# Patient Record
Sex: MALE | Race: BLACK OR AFRICAN AMERICAN | URBAN - METROPOLITAN AREA
[De-identification: names, ages, dates, MRNs, and addresses within clinical notes are randomized per-mention and may not be internally consistent; named-entity substitution may affect disease eponyms.]

---

## 2017-01-05 ENCOUNTER — OFFICE VISIT (OUTPATIENT)
Dept: PRIMARY CARE CLINIC | Age: 52
End: 2017-01-05

## 2017-01-05 VITALS
OXYGEN SATURATION: 96 % | HEART RATE: 91 BPM | DIASTOLIC BLOOD PRESSURE: 100 MMHG | BODY MASS INDEX: 39.65 KG/M2 | HEIGHT: 70 IN | WEIGHT: 277 LBS | SYSTOLIC BLOOD PRESSURE: 160 MMHG

## 2017-01-05 DIAGNOSIS — I10 ESSENTIAL HYPERTENSION: ICD-10-CM

## 2017-01-05 DIAGNOSIS — E11.69 TYPE 2 DIABETES MELLITUS WITH OTHER SPECIFIED COMPLICATION (HCC): ICD-10-CM

## 2017-01-05 DIAGNOSIS — E78.49 OTHER HYPERLIPIDEMIA: ICD-10-CM

## 2017-01-05 DIAGNOSIS — J06.9 URI, ACUTE: Primary | ICD-10-CM

## 2017-01-05 PROCEDURE — 99214 OFFICE O/P EST MOD 30 MIN: CPT | Performed by: INTERNAL MEDICINE

## 2017-01-05 RX ORDER — LOSARTAN POTASSIUM AND HYDROCHLOROTHIAZIDE 25; 100 MG/1; MG/1
1 TABLET ORAL DAILY
Qty: 30 TABLET | Refills: 5 | Status: SHIPPED | OUTPATIENT
Start: 2017-01-05 | End: 2017-03-09 | Stop reason: SDUPTHER

## 2017-01-05 RX ORDER — ASPIRIN 81 MG/1
81 TABLET ORAL DAILY
Qty: 90 TABLET | Refills: 5 | Status: SHIPPED | OUTPATIENT
Start: 2017-01-05 | End: 2017-03-09 | Stop reason: SDUPTHER

## 2017-01-05 RX ORDER — ACETAMINOPHEN 500 MG
500 TABLET ORAL EVERY 6 HOURS PRN
Qty: 30 TABLET | Refills: 1 | Status: SHIPPED | OUTPATIENT
Start: 2017-01-05 | End: 2017-08-24 | Stop reason: SDUPTHER

## 2017-01-05 RX ORDER — GUAIFENESIN 600 MG/1
600 TABLET, EXTENDED RELEASE ORAL 2 TIMES DAILY
Qty: 12 TABLET | Refills: 0 | Status: SHIPPED | OUTPATIENT
Start: 2017-01-05 | End: 2017-03-09

## 2017-01-05 RX ORDER — METFORMIN HYDROCHLORIDE 500 MG/1
500 TABLET, EXTENDED RELEASE ORAL
Qty: 30 TABLET | Refills: 5 | Status: SHIPPED | OUTPATIENT
Start: 2017-01-05 | End: 2017-03-09 | Stop reason: SDUPTHER

## 2017-01-05 RX ORDER — AMLODIPINE BESYLATE 5 MG/1
5 TABLET ORAL DAILY
Qty: 30 TABLET | Refills: 3 | Status: SHIPPED | OUTPATIENT
Start: 2017-01-05 | End: 2017-03-09 | Stop reason: SDUPTHER

## 2017-01-05 RX ORDER — ATORVASTATIN CALCIUM 20 MG/1
20 TABLET, FILM COATED ORAL DAILY
Qty: 30 TABLET | Refills: 6 | Status: SHIPPED | OUTPATIENT
Start: 2017-01-05 | End: 2017-03-09 | Stop reason: SDUPTHER

## 2017-01-05 ASSESSMENT — ENCOUNTER SYMPTOMS
SHORTNESS OF BREATH: 0
WHEEZING: 0
CHEST TIGHTNESS: 0
GASTROINTESTINAL NEGATIVE: 1
BLURRED VISION: 0
DIARRHEA: 0
COUGH: 1
CONSTIPATION: 0
BLOOD IN STOOL: 0

## 2017-03-09 ENCOUNTER — OFFICE VISIT (OUTPATIENT)
Dept: PRIMARY CARE CLINIC | Age: 52
End: 2017-03-09

## 2017-03-09 VITALS
TEMPERATURE: 98.8 F | BODY MASS INDEX: 39.6 KG/M2 | DIASTOLIC BLOOD PRESSURE: 90 MMHG | WEIGHT: 276 LBS | OXYGEN SATURATION: 97 % | SYSTOLIC BLOOD PRESSURE: 150 MMHG

## 2017-03-09 DIAGNOSIS — E11.69 TYPE 2 DIABETES MELLITUS WITH OTHER SPECIFIED COMPLICATION (HCC): ICD-10-CM

## 2017-03-09 DIAGNOSIS — R05.9 COUGH: ICD-10-CM

## 2017-03-09 DIAGNOSIS — I10 ESSENTIAL HYPERTENSION: Primary | ICD-10-CM

## 2017-03-09 DIAGNOSIS — E78.49 OTHER HYPERLIPIDEMIA: ICD-10-CM

## 2017-03-09 DIAGNOSIS — E66.01 MORBID OBESITY, UNSPECIFIED OBESITY TYPE (HCC): ICD-10-CM

## 2017-03-09 LAB
A/G RATIO: 1.1 (ref 1.1–2.2)
ALBUMIN SERPL-MCNC: 3.6 G/DL (ref 3.4–5)
ALP BLD-CCNC: 66 U/L (ref 40–129)
ALT SERPL-CCNC: 15 U/L (ref 10–40)
ANION GAP SERPL CALCULATED.3IONS-SCNC: 13 MMOL/L (ref 3–16)
AST SERPL-CCNC: 15 U/L (ref 15–37)
BASOPHILS ABSOLUTE: 0.1 K/UL (ref 0–0.2)
BASOPHILS RELATIVE PERCENT: 0.7 %
BILIRUB SERPL-MCNC: 0.3 MG/DL (ref 0–1)
BILIRUBIN URINE: NEGATIVE
BLOOD, URINE: ABNORMAL
BUN BLDV-MCNC: 13 MG/DL (ref 7–20)
CALCIUM SERPL-MCNC: 9.1 MG/DL (ref 8.3–10.6)
CHLORIDE BLD-SCNC: 100 MMOL/L (ref 99–110)
CHOLESTEROL, TOTAL: 127 MG/DL (ref 0–199)
CLARITY: CLEAR
CO2: 27 MMOL/L (ref 21–32)
COLOR: YELLOW
CREAT SERPL-MCNC: 1.2 MG/DL (ref 0.9–1.3)
EOSINOPHILS ABSOLUTE: 0.2 K/UL (ref 0–0.6)
EOSINOPHILS RELATIVE PERCENT: 2.9 %
EPITHELIAL CELLS, UA: 1 /HPF (ref 0–5)
GFR AFRICAN AMERICAN: >60
GFR NON-AFRICAN AMERICAN: >60
GLOBULIN: 3.3 G/DL
GLUCOSE BLD-MCNC: 107 MG/DL (ref 70–99)
GLUCOSE URINE: 250 MG/DL
HBA1C MFR BLD: 10.1 %
HCT VFR BLD CALC: 43.9 % (ref 40.5–52.5)
HDLC SERPL-MCNC: 49 MG/DL (ref 40–60)
HEMOGLOBIN: 14.1 G/DL (ref 13.5–17.5)
HYALINE CASTS: 0 /HPF (ref 0–8)
KETONES, URINE: NEGATIVE MG/DL
LDL CHOLESTEROL CALCULATED: 61 MG/DL
LEUKOCYTE ESTERASE, URINE: NEGATIVE
LYMPHOCYTES ABSOLUTE: 2.7 K/UL (ref 1–5.1)
LYMPHOCYTES RELATIVE PERCENT: 39.5 %
MCH RBC QN AUTO: 27.6 PG (ref 26–34)
MCHC RBC AUTO-ENTMCNC: 32 G/DL (ref 31–36)
MCV RBC AUTO: 86.1 FL (ref 80–100)
MICROSCOPIC EXAMINATION: YES
MONOCYTES ABSOLUTE: 0.7 K/UL (ref 0–1.3)
MONOCYTES RELATIVE PERCENT: 10.6 %
NEUTROPHILS ABSOLUTE: 3.1 K/UL (ref 1.7–7.7)
NEUTROPHILS RELATIVE PERCENT: 46.3 %
NITRITE, URINE: NEGATIVE
PDW BLD-RTO: 14.8 % (ref 12.4–15.4)
PH UA: 6
PLATELET # BLD: 303 K/UL (ref 135–450)
PMV BLD AUTO: 8.3 FL (ref 5–10.5)
POTASSIUM SERPL-SCNC: 4.5 MMOL/L (ref 3.5–5.1)
PROTEIN UA: >=300 MG/DL
RBC # BLD: 5.1 M/UL (ref 4.2–5.9)
RBC UA: 9 /HPF (ref 0–4)
SODIUM BLD-SCNC: 140 MMOL/L (ref 136–145)
SPECIFIC GRAVITY UA: 1.02
TOTAL PROTEIN: 6.9 G/DL (ref 6.4–8.2)
TRIGL SERPL-MCNC: 83 MG/DL (ref 0–150)
TSH SERPL DL<=0.05 MIU/L-ACNC: 2.92 UIU/ML (ref 0.27–4.2)
URINE TYPE: ABNORMAL
UROBILINOGEN, URINE: 0.2 E.U./DL
VLDLC SERPL CALC-MCNC: 17 MG/DL
WBC # BLD: 6.8 K/UL (ref 4–11)
WBC UA: 1 /HPF (ref 0–5)

## 2017-03-09 PROCEDURE — 83036 HEMOGLOBIN GLYCOSYLATED A1C: CPT | Performed by: INTERNAL MEDICINE

## 2017-03-09 PROCEDURE — 99214 OFFICE O/P EST MOD 30 MIN: CPT | Performed by: INTERNAL MEDICINE

## 2017-03-09 RX ORDER — ATORVASTATIN CALCIUM 20 MG/1
20 TABLET, FILM COATED ORAL DAILY
Qty: 30 TABLET | Refills: 6 | Status: SHIPPED | OUTPATIENT
Start: 2017-03-09 | End: 2017-05-16 | Stop reason: SDUPTHER

## 2017-03-09 RX ORDER — METFORMIN HYDROCHLORIDE 500 MG/1
500 TABLET, EXTENDED RELEASE ORAL
Qty: 30 TABLET | Refills: 5 | Status: SHIPPED | OUTPATIENT
Start: 2017-03-09 | End: 2017-08-24 | Stop reason: SDUPTHER

## 2017-03-09 RX ORDER — ASPIRIN 81 MG/1
81 TABLET ORAL DAILY
Qty: 90 TABLET | Refills: 5 | Status: SHIPPED | OUTPATIENT
Start: 2017-03-09 | End: 2017-05-16 | Stop reason: SDUPTHER

## 2017-03-09 RX ORDER — AMLODIPINE BESYLATE 10 MG/1
10 TABLET ORAL DAILY
Qty: 30 TABLET | Refills: 3 | Status: SHIPPED | OUTPATIENT
Start: 2017-03-09 | End: 2017-08-24 | Stop reason: SDUPTHER

## 2017-03-09 RX ORDER — LOSARTAN POTASSIUM AND HYDROCHLOROTHIAZIDE 25; 100 MG/1; MG/1
1 TABLET ORAL DAILY
Qty: 30 TABLET | Refills: 5 | Status: SHIPPED | OUTPATIENT
Start: 2017-03-09 | End: 2017-08-24 | Stop reason: SDUPTHER

## 2017-03-09 ASSESSMENT — ENCOUNTER SYMPTOMS
BLOOD IN STOOL: 0
GASTROINTESTINAL NEGATIVE: 1
DIARRHEA: 0
COUGH: 1
BLURRED VISION: 0
WHEEZING: 0
SHORTNESS OF BREATH: 0
CHEST TIGHTNESS: 0
CONSTIPATION: 0

## 2017-04-19 DIAGNOSIS — E11.69 TYPE 2 DIABETES MELLITUS WITH OTHER SPECIFIED COMPLICATION (HCC): ICD-10-CM

## 2017-04-19 RX ORDER — INSULIN GLARGINE 300 U/ML
INJECTION, SOLUTION SUBCUTANEOUS
Qty: 5 PEN | Refills: 5 | OUTPATIENT
Start: 2017-04-19

## 2017-05-16 ENCOUNTER — OFFICE VISIT (OUTPATIENT)
Dept: PRIMARY CARE CLINIC | Age: 52
End: 2017-05-16

## 2017-05-16 VITALS
DIASTOLIC BLOOD PRESSURE: 70 MMHG | HEIGHT: 70 IN | SYSTOLIC BLOOD PRESSURE: 130 MMHG | BODY MASS INDEX: 37.94 KG/M2 | TEMPERATURE: 98.4 F | HEART RATE: 91 BPM | OXYGEN SATURATION: 98 % | WEIGHT: 265 LBS | RESPIRATION RATE: 17 BRPM

## 2017-05-16 DIAGNOSIS — E78.49 OTHER HYPERLIPIDEMIA: ICD-10-CM

## 2017-05-16 DIAGNOSIS — Z23 NEED FOR TDAP VACCINATION: ICD-10-CM

## 2017-05-16 DIAGNOSIS — Z23 NEED FOR PNEUMOCOCCAL VACCINE: ICD-10-CM

## 2017-05-16 DIAGNOSIS — Z13.9 SCREENING: ICD-10-CM

## 2017-05-16 DIAGNOSIS — E11.69 TYPE 2 DIABETES MELLITUS WITH OTHER SPECIFIED COMPLICATION (HCC): Primary | ICD-10-CM

## 2017-05-16 DIAGNOSIS — I10 ESSENTIAL HYPERTENSION: ICD-10-CM

## 2017-05-16 LAB — HBA1C MFR BLD: 9.9 %

## 2017-05-16 PROCEDURE — 90715 TDAP VACCINE 7 YRS/> IM: CPT | Performed by: INTERNAL MEDICINE

## 2017-05-16 PROCEDURE — 90471 IMMUNIZATION ADMIN: CPT | Performed by: INTERNAL MEDICINE

## 2017-05-16 PROCEDURE — 90732 PPSV23 VACC 2 YRS+ SUBQ/IM: CPT | Performed by: INTERNAL MEDICINE

## 2017-05-16 PROCEDURE — 90472 IMMUNIZATION ADMIN EACH ADD: CPT | Performed by: INTERNAL MEDICINE

## 2017-05-16 PROCEDURE — 99214 OFFICE O/P EST MOD 30 MIN: CPT | Performed by: INTERNAL MEDICINE

## 2017-05-16 PROCEDURE — 83036 HEMOGLOBIN GLYCOSYLATED A1C: CPT | Performed by: INTERNAL MEDICINE

## 2017-05-16 RX ORDER — ASPIRIN 81 MG/1
81 TABLET ORAL DAILY
Qty: 90 TABLET | Refills: 5 | Status: SHIPPED | OUTPATIENT
Start: 2017-05-16 | End: 2018-05-20 | Stop reason: SDUPTHER

## 2017-05-16 RX ORDER — ATORVASTATIN CALCIUM 20 MG/1
20 TABLET, FILM COATED ORAL DAILY
Qty: 30 TABLET | Refills: 6 | Status: SHIPPED | OUTPATIENT
Start: 2017-05-16 | End: 2017-08-24 | Stop reason: SDUPTHER

## 2017-05-16 ASSESSMENT — PATIENT HEALTH QUESTIONNAIRE - PHQ9
1. LITTLE INTEREST OR PLEASURE IN DOING THINGS: 0
SUM OF ALL RESPONSES TO PHQ QUESTIONS 1-9: 0
SUM OF ALL RESPONSES TO PHQ9 QUESTIONS 1 & 2: 0
2. FEELING DOWN, DEPRESSED OR HOPELESS: 0

## 2017-05-16 ASSESSMENT — ENCOUNTER SYMPTOMS
BLOOD IN STOOL: 0
SHORTNESS OF BREATH: 0
CHEST TIGHTNESS: 0
GASTROINTESTINAL NEGATIVE: 1
DIARRHEA: 0
WHEEZING: 0
BLURRED VISION: 0
CONSTIPATION: 0

## 2017-05-17 LAB
HEPATITIS B CORE TOTAL ANTIBODY: NEGATIVE
HEPATITIS C ANTIBODY INTERPRETATION: NORMAL

## 2017-05-19 LAB — HIV-1 WESTERN BLOT: NEGATIVE

## 2017-08-24 ENCOUNTER — OFFICE VISIT (OUTPATIENT)
Dept: PRIMARY CARE CLINIC | Age: 52
End: 2017-08-24

## 2017-08-24 ENCOUNTER — HOSPITAL ENCOUNTER (OUTPATIENT)
Dept: CT IMAGING | Age: 52
Discharge: HOME | End: 2017-08-24
Attending: INTERNAL MEDICINE | Admitting: INTERNAL MEDICINE

## 2017-08-24 VITALS
BODY MASS INDEX: 38.37 KG/M2 | WEIGHT: 268 LBS | SYSTOLIC BLOOD PRESSURE: 150 MMHG | RESPIRATION RATE: 16 BRPM | TEMPERATURE: 97.2 F | HEIGHT: 70 IN | OXYGEN SATURATION: 98 % | HEART RATE: 82 BPM | DIASTOLIC BLOOD PRESSURE: 90 MMHG

## 2017-08-24 DIAGNOSIS — R31.29 MICROSCOPIC HEMATURIA: ICD-10-CM

## 2017-08-24 DIAGNOSIS — I10 ESSENTIAL HYPERTENSION: ICD-10-CM

## 2017-08-24 DIAGNOSIS — E11.69 TYPE 2 DIABETES MELLITUS WITH OTHER SPECIFIED COMPLICATION (HCC): ICD-10-CM

## 2017-08-24 DIAGNOSIS — F44.89 CONFUSIONAL STATE: ICD-10-CM

## 2017-08-24 DIAGNOSIS — S09.90XA HEAD TRAUMA, INITIAL ENCOUNTER: ICD-10-CM

## 2017-08-24 DIAGNOSIS — S09.90XA INJURY OF HEAD: ICD-10-CM

## 2017-08-24 DIAGNOSIS — S09.90XA HEAD TRAUMA, INITIAL ENCOUNTER: Primary | ICD-10-CM

## 2017-08-24 LAB
AMPHETAMINE SCREEN, URINE: NORMAL
BARBITURATE SCREEN URINE: NORMAL
BENZODIAZEPINE SCREEN, URINE: NORMAL
BILIRUBIN URINE: NEGATIVE
BLOOD, URINE: ABNORMAL
CANNABINOID SCREEN URINE: NORMAL
CLARITY: CLEAR
COCAINE METABOLITE SCREEN URINE: NORMAL
COLOR: YELLOW
EPITHELIAL CELLS, UA: 0 /HPF (ref 0–5)
GLUCOSE URINE: 100 MG/DL
HBA1C MFR BLD: 7.9 %
HYALINE CASTS: 1 /LPF (ref 0–8)
KETONES, URINE: NEGATIVE MG/DL
LEUKOCYTE ESTERASE, URINE: NEGATIVE
Lab: NORMAL
METHADONE SCREEN, URINE: NORMAL
MICROSCOPIC EXAMINATION: YES
NITRITE, URINE: NEGATIVE
OPIATE SCREEN URINE: NORMAL
OXYCODONE URINE: NORMAL
PH UA: 6
PH UA: 6
PHENCYCLIDINE SCREEN URINE: NORMAL
PROPOXYPHENE SCREEN: NORMAL
PROTEIN UA: 100 MG/DL
RBC UA: 4 /HPF (ref 0–4)
SPECIFIC GRAVITY UA: 1.02
URINE TYPE: ABNORMAL
UROBILINOGEN, URINE: 0.2 E.U./DL
WBC UA: 1 /HPF (ref 0–5)

## 2017-08-24 PROCEDURE — 99214 OFFICE O/P EST MOD 30 MIN: CPT | Performed by: INTERNAL MEDICINE

## 2017-08-24 PROCEDURE — 83036 HEMOGLOBIN GLYCOSYLATED A1C: CPT | Performed by: INTERNAL MEDICINE

## 2017-08-24 RX ORDER — ATORVASTATIN CALCIUM 20 MG/1
20 TABLET, FILM COATED ORAL DAILY
Qty: 30 TABLET | Refills: 6 | Status: SHIPPED | OUTPATIENT
Start: 2017-08-24 | End: 2017-11-02 | Stop reason: SDUPTHER

## 2017-08-24 RX ORDER — METOPROLOL TARTRATE 50 MG/1
50 TABLET, FILM COATED ORAL DAILY
Qty: 30 TABLET | Refills: 3 | Status: SHIPPED | OUTPATIENT
Start: 2017-08-24 | End: 2017-11-02 | Stop reason: SDUPTHER

## 2017-08-24 RX ORDER — ACETAMINOPHEN 500 MG
500 TABLET ORAL EVERY 6 HOURS PRN
Qty: 30 TABLET | Refills: 1 | Status: SHIPPED | OUTPATIENT
Start: 2017-08-24 | End: 2018-12-19

## 2017-08-24 RX ORDER — METFORMIN HYDROCHLORIDE 500 MG/1
500 TABLET, EXTENDED RELEASE ORAL
Qty: 30 TABLET | Refills: 5 | Status: SHIPPED | OUTPATIENT
Start: 2017-08-24 | End: 2017-11-02

## 2017-08-24 RX ORDER — LOSARTAN POTASSIUM AND HYDROCHLOROTHIAZIDE 25; 100 MG/1; MG/1
1 TABLET ORAL DAILY
Qty: 30 TABLET | Refills: 5 | Status: SHIPPED | OUTPATIENT
Start: 2017-08-24 | End: 2017-11-02 | Stop reason: SDUPTHER

## 2017-08-24 RX ORDER — AMLODIPINE BESYLATE 10 MG/1
10 TABLET ORAL DAILY
Qty: 30 TABLET | Refills: 5 | Status: SHIPPED | OUTPATIENT
Start: 2017-08-24 | End: 2017-11-02 | Stop reason: SDUPTHER

## 2017-08-24 ASSESSMENT — ENCOUNTER SYMPTOMS
GASTROINTESTINAL NEGATIVE: 1
WHEEZING: 0
DIARRHEA: 0
CONSTIPATION: 0
BLURRED VISION: 0
BLOOD IN STOOL: 0
CHEST TIGHTNESS: 0
SHORTNESS OF BREATH: 0

## 2017-10-26 ENCOUNTER — OFFICE VISIT (OUTPATIENT)
Dept: PRIMARY CARE CLINIC | Age: 52
End: 2017-10-26

## 2017-10-26 VITALS
DIASTOLIC BLOOD PRESSURE: 80 MMHG | HEART RATE: 93 BPM | TEMPERATURE: 98.2 F | SYSTOLIC BLOOD PRESSURE: 140 MMHG | OXYGEN SATURATION: 97 % | WEIGHT: 265 LBS | HEIGHT: 70 IN | BODY MASS INDEX: 37.94 KG/M2

## 2017-10-26 DIAGNOSIS — R11.2 NAUSEA AND VOMITING, INTRACTABILITY OF VOMITING NOT SPECIFIED, UNSPECIFIED VOMITING TYPE: Primary | ICD-10-CM

## 2017-10-26 DIAGNOSIS — R10.84 GENERALIZED ABDOMINAL PAIN: ICD-10-CM

## 2017-10-26 DIAGNOSIS — R19.7 DIARRHEA, UNSPECIFIED TYPE: ICD-10-CM

## 2017-10-26 DIAGNOSIS — R11.2 NAUSEA AND VOMITING, INTRACTABILITY OF VOMITING NOT SPECIFIED, UNSPECIFIED VOMITING TYPE: ICD-10-CM

## 2017-10-26 LAB
A/G RATIO: 1.1 (ref 1.1–2.2)
ALBUMIN SERPL-MCNC: 3.2 G/DL (ref 3.4–5)
ALP BLD-CCNC: 55 U/L (ref 40–129)
ALT SERPL-CCNC: 22 U/L (ref 10–40)
AMYLASE: 19 U/L (ref 25–115)
ANION GAP SERPL CALCULATED.3IONS-SCNC: 15 MMOL/L (ref 3–16)
AST SERPL-CCNC: 18 U/L (ref 15–37)
BASOPHILS ABSOLUTE: 0 K/UL (ref 0–0.2)
BASOPHILS RELATIVE PERCENT: 0.4 %
BILIRUB SERPL-MCNC: 0.4 MG/DL (ref 0–1)
BILIRUBIN URINE: ABNORMAL
BLOOD, URINE: ABNORMAL
BUN BLDV-MCNC: 29 MG/DL (ref 7–20)
CALCIUM SERPL-MCNC: 8.3 MG/DL (ref 8.3–10.6)
CASTS 2: ABNORMAL /LPF
CASTS: ABNORMAL /LPF
CHLORIDE BLD-SCNC: 95 MMOL/L (ref 99–110)
CLARITY: ABNORMAL
CO2: 23 MMOL/L (ref 21–32)
COLOR: ABNORMAL
CREAT SERPL-MCNC: 1.8 MG/DL (ref 0.9–1.3)
EOSINOPHILS ABSOLUTE: 0 K/UL (ref 0–0.6)
EOSINOPHILS RELATIVE PERCENT: 0.4 %
EPITHELIAL CELLS, UA: 3 /HPF (ref 0–5)
GFR AFRICAN AMERICAN: 48
GFR NON-AFRICAN AMERICAN: 40
GLOBULIN: 2.9 G/DL
GLUCOSE BLD-MCNC: 387 MG/DL (ref 70–99)
GLUCOSE URINE: >=1000 MG/DL
HAV IGM SER IA-ACNC: NORMAL
HCT VFR BLD CALC: 40 % (ref 40.5–52.5)
HEMOGLOBIN: 13.1 G/DL (ref 13.5–17.5)
HEPATITIS B CORE IGM ANTIBODY: NORMAL
HEPATITIS B SURFACE ANTIGEN INTERPRETATION: NORMAL
HEPATITIS C ANTIBODY INTERPRETATION: NORMAL
KETONES, URINE: ABNORMAL MG/DL
LEUKOCYTE ESTERASE, URINE: NEGATIVE
LIPASE: 15 U/L (ref 13–60)
LYMPHOCYTES ABSOLUTE: 1.3 K/UL (ref 1–5.1)
LYMPHOCYTES RELATIVE PERCENT: 11.1 %
MCH RBC QN AUTO: 28.4 PG (ref 26–34)
MCHC RBC AUTO-ENTMCNC: 32.7 G/DL (ref 31–36)
MCV RBC AUTO: 86.9 FL (ref 80–100)
MICROSCOPIC EXAMINATION: YES
MONOCYTES ABSOLUTE: 0.8 K/UL (ref 0–1.3)
MONOCYTES RELATIVE PERCENT: 6.6 %
NEUTROPHILS ABSOLUTE: 9.5 K/UL (ref 1.7–7.7)
NEUTROPHILS RELATIVE PERCENT: 81.5 %
NITRITE, URINE: NEGATIVE
PDW BLD-RTO: 14.6 % (ref 12.4–15.4)
PH UA: 5.5
PLATELET # BLD: 347 K/UL (ref 135–450)
PMV BLD AUTO: 8.7 FL (ref 5–10.5)
POTASSIUM SERPL-SCNC: 4.2 MMOL/L (ref 3.5–5.1)
PROTEIN UA: >=300 MG/DL
RBC # BLD: 4.6 M/UL (ref 4.2–5.9)
RBC UA: 9 /HPF (ref 0–4)
SODIUM BLD-SCNC: 133 MMOL/L (ref 136–145)
SPECIFIC GRAVITY UA: 1.03
TOTAL PROTEIN: 6.1 G/DL (ref 6.4–8.2)
URINE TYPE: ABNORMAL
UROBILINOGEN, URINE: 1 E.U./DL
WBC # BLD: 11.7 K/UL (ref 4–11)
WBC UA: 8 /HPF (ref 0–5)

## 2017-10-26 PROCEDURE — 1036F TOBACCO NON-USER: CPT | Performed by: INTERNAL MEDICINE

## 2017-10-26 PROCEDURE — G8484 FLU IMMUNIZE NO ADMIN: HCPCS | Performed by: INTERNAL MEDICINE

## 2017-10-26 PROCEDURE — G8417 CALC BMI ABV UP PARAM F/U: HCPCS | Performed by: INTERNAL MEDICINE

## 2017-10-26 PROCEDURE — G8427 DOCREV CUR MEDS BY ELIG CLIN: HCPCS | Performed by: INTERNAL MEDICINE

## 2017-10-26 PROCEDURE — 99214 OFFICE O/P EST MOD 30 MIN: CPT | Performed by: INTERNAL MEDICINE

## 2017-10-26 PROCEDURE — 3017F COLORECTAL CA SCREEN DOC REV: CPT | Performed by: INTERNAL MEDICINE

## 2017-10-26 RX ORDER — ONDANSETRON 4 MG/1
4 TABLET, FILM COATED ORAL EVERY 8 HOURS PRN
Qty: 20 TABLET | Refills: 0 | Status: SHIPPED | OUTPATIENT
Start: 2017-10-26 | End: 2017-11-30

## 2017-10-26 ASSESSMENT — ENCOUNTER SYMPTOMS
VOMITING: 1
CONSTIPATION: 0
CHEST TIGHTNESS: 0
NAUSEA: 1
BLURRED VISION: 0
DIARRHEA: 1
SHORTNESS OF BREATH: 0
ROS SKIN COMMENTS: ITCHING
WHEEZING: 0
BLOOD IN STOOL: 0

## 2017-10-26 NOTE — LETTER
[facility]  [address] [address]  Phone: [phone]  Fax: [phone]    Julee Nelson MD        October 26, 2017     Patient: Warren Anne   YOB: 1965   Date of Visit: 10/26/2017       To Whom it May Concern:    Warren Anne was seen in my clinic on 10/26/2017. Please excuse him from work  10/24/17 thru 10/27/17 return to work 10/30/17. If you have any questions or concerns, please don't hesitate to call.     Sincerely,         Julee Nelson MD

## 2017-11-02 ENCOUNTER — OFFICE VISIT (OUTPATIENT)
Dept: PRIMARY CARE CLINIC | Age: 52
End: 2017-11-02

## 2017-11-02 VITALS
TEMPERATURE: 98.4 F | WEIGHT: 265 LBS | DIASTOLIC BLOOD PRESSURE: 70 MMHG | HEIGHT: 70 IN | OXYGEN SATURATION: 98 % | BODY MASS INDEX: 37.94 KG/M2 | SYSTOLIC BLOOD PRESSURE: 110 MMHG | HEART RATE: 79 BPM

## 2017-11-02 DIAGNOSIS — E78.49 OTHER HYPERLIPIDEMIA: ICD-10-CM

## 2017-11-02 DIAGNOSIS — Z09 FOLLOW-UP EXAM: Primary | ICD-10-CM

## 2017-11-02 DIAGNOSIS — E11.9 DIABETES MELLITUS TYPE 2, INSULIN DEPENDENT (HCC): ICD-10-CM

## 2017-11-02 DIAGNOSIS — I10 ESSENTIAL HYPERTENSION: ICD-10-CM

## 2017-11-02 DIAGNOSIS — Z79.4 DIABETES MELLITUS TYPE 2, INSULIN DEPENDENT (HCC): ICD-10-CM

## 2017-11-02 DIAGNOSIS — S99.922A INJURY OF LEFT FOOT INCLUDING TOES, INITIAL ENCOUNTER: ICD-10-CM

## 2017-11-02 DIAGNOSIS — Z23 NEEDS FLU SHOT: ICD-10-CM

## 2017-11-02 LAB
ANION GAP SERPL CALCULATED.3IONS-SCNC: 15 MMOL/L (ref 3–16)
BILIRUBIN URINE: ABNORMAL
BLOOD, URINE: ABNORMAL
BUN BLDV-MCNC: 15 MG/DL (ref 7–20)
CALCIUM SERPL-MCNC: 9.1 MG/DL (ref 8.3–10.6)
CHLORIDE BLD-SCNC: 97 MMOL/L (ref 99–110)
CLARITY: ABNORMAL
CO2: 29 MMOL/L (ref 21–32)
COLOR: ABNORMAL
CREAT SERPL-MCNC: 1.3 MG/DL (ref 0.9–1.3)
EPITHELIAL CELLS, UA: 3 /HPF (ref 0–5)
GFR AFRICAN AMERICAN: >60
GFR NON-AFRICAN AMERICAN: 58
GLUCOSE BLD-MCNC: 146 MG/DL (ref 70–99)
GLUCOSE URINE: >=1000 MG/DL
HBA1C MFR BLD: 8.6 %
HYALINE CASTS: 18 /LPF (ref 0–8)
KETONES, URINE: ABNORMAL MG/DL
LEUKOCYTE ESTERASE, URINE: NEGATIVE
MICROSCOPIC EXAMINATION: YES
NITRITE, URINE: NEGATIVE
PH UA: 5.5
POTASSIUM SERPL-SCNC: 4.1 MMOL/L (ref 3.5–5.1)
PROTEIN UA: >=300 MG/DL
RBC UA: 7 /HPF (ref 0–4)
SODIUM BLD-SCNC: 141 MMOL/L (ref 136–145)
SPECIFIC GRAVITY UA: 1.03
URINE TYPE: ABNORMAL
UROBILINOGEN, URINE: 0.2 E.U./DL
WBC UA: 7 /HPF (ref 0–5)

## 2017-11-02 PROCEDURE — 83036 HEMOGLOBIN GLYCOSYLATED A1C: CPT | Performed by: INTERNAL MEDICINE

## 2017-11-02 PROCEDURE — 3017F COLORECTAL CA SCREEN DOC REV: CPT | Performed by: INTERNAL MEDICINE

## 2017-11-02 PROCEDURE — 90630 INFLUENZA, QUADV, 18-64 YRS, ID, PF, MICRO INJ, 0.1ML (FLUZONE QUADV, PF): CPT | Performed by: INTERNAL MEDICINE

## 2017-11-02 PROCEDURE — 1036F TOBACCO NON-USER: CPT | Performed by: INTERNAL MEDICINE

## 2017-11-02 PROCEDURE — 90471 IMMUNIZATION ADMIN: CPT | Performed by: INTERNAL MEDICINE

## 2017-11-02 PROCEDURE — 3045F PR MOST RECENT HEMOGLOBIN A1C LEVEL 7.0-9.0%: CPT | Performed by: INTERNAL MEDICINE

## 2017-11-02 PROCEDURE — G8417 CALC BMI ABV UP PARAM F/U: HCPCS | Performed by: INTERNAL MEDICINE

## 2017-11-02 PROCEDURE — G8484 FLU IMMUNIZE NO ADMIN: HCPCS | Performed by: INTERNAL MEDICINE

## 2017-11-02 PROCEDURE — 99214 OFFICE O/P EST MOD 30 MIN: CPT | Performed by: INTERNAL MEDICINE

## 2017-11-02 PROCEDURE — G8427 DOCREV CUR MEDS BY ELIG CLIN: HCPCS | Performed by: INTERNAL MEDICINE

## 2017-11-02 RX ORDER — AMLODIPINE BESYLATE 10 MG/1
10 TABLET ORAL DAILY
Qty: 30 TABLET | Refills: 5 | Status: SHIPPED | OUTPATIENT
Start: 2017-11-02 | End: 2017-11-30 | Stop reason: SDUPTHER

## 2017-11-02 RX ORDER — ATORVASTATIN CALCIUM 20 MG/1
20 TABLET, FILM COATED ORAL DAILY
Qty: 30 TABLET | Refills: 6 | Status: SHIPPED | OUTPATIENT
Start: 2017-11-02 | End: 2017-11-30 | Stop reason: SDUPTHER

## 2017-11-02 RX ORDER — LOSARTAN POTASSIUM AND HYDROCHLOROTHIAZIDE 25; 100 MG/1; MG/1
1 TABLET ORAL DAILY
Qty: 30 TABLET | Refills: 5 | Status: SHIPPED | OUTPATIENT
Start: 2017-11-02 | End: 2017-11-30 | Stop reason: SDUPTHER

## 2017-11-02 RX ORDER — METOPROLOL TARTRATE 50 MG/1
50 TABLET, FILM COATED ORAL DAILY
Qty: 30 TABLET | Refills: 3 | Status: SHIPPED | OUTPATIENT
Start: 2017-11-02 | End: 2017-11-30 | Stop reason: SDUPTHER

## 2017-11-02 ASSESSMENT — ENCOUNTER SYMPTOMS
NAUSEA: 0
DIARRHEA: 0
CONSTIPATION: 0
SHORTNESS OF BREATH: 0
WHEEZING: 0
BLURRED VISION: 0
VOMITING: 0
CHEST TIGHTNESS: 0
BLOOD IN STOOL: 0

## 2017-11-02 NOTE — PROGRESS NOTES
Subjective:      Patient ID: Lee Ann Smith is a 46 y.o. male. 17 Patient presents with:  2 Week Follow-Up: nausea / Diarrhoea / Vomitting . Resolved   Diabetes: only taking am Toujeo . pm Toujeo not possible b/e work . injured toes! Hypertension: taking reg             Last seen  10/26/17 Patient presents with:  Nausea & Vomiting: c/o nausea vomiting and diarrhea for 3 days  Diarrhea: itching          Last seen  17 Patient presents with:  Diabetes: Patient states he has headaches since hitting his  hit his head on a wall 2 weeks ago   Headache: here b/e friends say he is acting 'confused '            Last seen  5/16/17     3/23/16  Established New Doctor:             Review of Systems   Constitutional: Negative for chills, fatigue and fever. Flu vac   Tdap     Pneumovac       HENT:        Dental exam >>   Eyes: Negative for blurred vision. Eye exam ; retinopathy. S/P Laser treatment    Respiratory: Negative for chest tightness, shortness of breath and wheezing. Smokes Marijuana !! ; reg beer drinker!!!           Cardiovascular: Negative. Negative for chest pain. HTN >  No known CAD     Father  of CAD in his 45s and a brother too      Gastrointestinal: Negative for blood in stool, constipation, diarrhea, nausea and vomiting. No FH of  ca colon     Colonoscopy ,  Dr Laura Avery . Good for 10 yrs    Endocrine:        Diabetes    Genitourinary: Negative for dysuria. No FH of ca prostate     One daughter   Skin: Negative for rash. Itching resolved    Allergic/Immunologic: Negative for environmental allergies and food allergies. Neurological: Negative for dizziness, tremors, weakness and headaches. Psychiatric/Behavioral: Negative for behavioral problems and sleep disturbance. Objective:   Physical Exam   Constitutional: He is oriented to person, place, and time. He appears well-nourished. No distress.    Eyes: Conjunctivae are normal.   Neck: Neck supple. Cardiovascular: Normal rate, regular rhythm and normal heart sounds. Pulmonary/Chest: Breath sounds normal.   Abdominal: He exhibits distension. There is no tenderness. Musculoskeletal: Normal range of motion. He exhibits no edema. Foot exam 11/17  Deformities: No  Rashes:  No  Callous:  Yes   Edema:  No  Injury:  Second toe both feet ; nontender  Sensory Deficit:  Yes   Nails: normal       Neurological: He is alert and oriented to person, place, and time. He has normal strength. Skin: Skin is warm. Psychiatric: He has a normal mood and affect. Vitals reviewed. Assessment:      Bakari Villar was seen today for 2 week follow-up, diabetes and hypertension. Diagnoses and all orders for this visit:    Follow-up exam  Stable . N/V/D resolved  DC Metformin  . Recheck UA and Renal function . Renal Insufficiency lolis    Diabetes mellitus type 2, insulin dependent ([facility])  A1C 8.6 . Increase Toujeo 90 U q am ( DC pm dose )  Advised to check and record sugars tid . Do not skip meals . Exercise reg      -     POCT glycosylated hemoglobin (Hb A1C)  -     Urinalysis; Future  -     BASIC METABOLIC PANEL; Future  -     insulin glargine (TOUJEO SOLOSTAR) 300 UNIT/ML injection pen; Inject 90 Units into the skin every morning  -     atorvastatin (LIPITOR) 20 MG tablet; Take 1 tablet by mouth daily  -      DIABETES FOOT EXAM    Essential hypertension  Controlled   -     amLODIPine (NORVASC) 10 MG tablet; Take 1 tablet by mouth daily  -     losartan-hydrochlorothiazide (HYZAAR) 100-25 MG per tablet; Take 1 tablet by mouth daily  -     metoprolol tartrate (LOPRESSOR) 50 MG tablet; Take 1 tablet by mouth daily    Other hyperlipidemia  -     atorvastatin (LIPITOR) 20 MG tablet; Take 1 tablet by mouth daily    Needs flu shot  -     INFLUENZA, QUADV, 18-64 YRS, ID, PF, MICRO INJ, 0.1ML (FLUZONE QUADV, PF)    Injury of left foot including toes, initial encounter  Second toe bhardwaj feet .  No

## 2017-11-03 DIAGNOSIS — N28.9 RENAL INSUFFICIENCY: Primary | ICD-10-CM

## 2017-11-30 ENCOUNTER — OFFICE VISIT (OUTPATIENT)
Dept: PRIMARY CARE CLINIC | Age: 52
End: 2017-11-30

## 2017-11-30 VITALS
WEIGHT: 265 LBS | TEMPERATURE: 97.8 F | OXYGEN SATURATION: 98 % | BODY MASS INDEX: 37.94 KG/M2 | DIASTOLIC BLOOD PRESSURE: 80 MMHG | HEIGHT: 70 IN | SYSTOLIC BLOOD PRESSURE: 140 MMHG | HEART RATE: 71 BPM

## 2017-11-30 DIAGNOSIS — Z79.4 DIABETES MELLITUS TYPE 2, INSULIN DEPENDENT (HCC): ICD-10-CM

## 2017-11-30 DIAGNOSIS — E11.9 DIABETES MELLITUS TYPE 2, INSULIN DEPENDENT (HCC): ICD-10-CM

## 2017-11-30 DIAGNOSIS — I10 ESSENTIAL HYPERTENSION: ICD-10-CM

## 2017-11-30 DIAGNOSIS — E78.49 OTHER HYPERLIPIDEMIA: ICD-10-CM

## 2017-11-30 PROCEDURE — 99214 OFFICE O/P EST MOD 30 MIN: CPT | Performed by: INTERNAL MEDICINE

## 2017-11-30 PROCEDURE — G8427 DOCREV CUR MEDS BY ELIG CLIN: HCPCS | Performed by: INTERNAL MEDICINE

## 2017-11-30 PROCEDURE — G8417 CALC BMI ABV UP PARAM F/U: HCPCS | Performed by: INTERNAL MEDICINE

## 2017-11-30 PROCEDURE — G8484 FLU IMMUNIZE NO ADMIN: HCPCS | Performed by: INTERNAL MEDICINE

## 2017-11-30 PROCEDURE — 3045F PR MOST RECENT HEMOGLOBIN A1C LEVEL 7.0-9.0%: CPT | Performed by: INTERNAL MEDICINE

## 2017-11-30 PROCEDURE — 3017F COLORECTAL CA SCREEN DOC REV: CPT | Performed by: INTERNAL MEDICINE

## 2017-11-30 PROCEDURE — 1036F TOBACCO NON-USER: CPT | Performed by: INTERNAL MEDICINE

## 2017-11-30 RX ORDER — ATORVASTATIN CALCIUM 20 MG/1
20 TABLET, FILM COATED ORAL DAILY
Qty: 30 TABLET | Refills: 6 | Status: SHIPPED | OUTPATIENT
Start: 2017-11-30 | End: 2018-03-15 | Stop reason: SDUPTHER

## 2017-11-30 RX ORDER — LOSARTAN POTASSIUM AND HYDROCHLOROTHIAZIDE 25; 100 MG/1; MG/1
1 TABLET ORAL DAILY
Qty: 30 TABLET | Refills: 5 | Status: SHIPPED | OUTPATIENT
Start: 2017-11-30 | End: 2018-03-15 | Stop reason: SDUPTHER

## 2017-11-30 RX ORDER — AMLODIPINE BESYLATE 10 MG/1
10 TABLET ORAL DAILY
Qty: 30 TABLET | Refills: 5 | Status: SHIPPED | OUTPATIENT
Start: 2017-11-30 | End: 2018-03-15 | Stop reason: SDUPTHER

## 2017-11-30 RX ORDER — METOPROLOL TARTRATE 50 MG/1
50 TABLET, FILM COATED ORAL DAILY
Qty: 30 TABLET | Refills: 3 | Status: SHIPPED | OUTPATIENT
Start: 2017-11-30 | End: 2018-03-15 | Stop reason: SDUPTHER

## 2017-11-30 ASSESSMENT — ENCOUNTER SYMPTOMS
VOMITING: 0
CONSTIPATION: 0
DIARRHEA: 0
WHEEZING: 0
SHORTNESS OF BREATH: 0
BLURRED VISION: 0
NAUSEA: 0
CHEST TIGHTNESS: 0
BLOOD IN STOOL: 0

## 2017-11-30 NOTE — PROGRESS NOTES
Subjective:      Patient ID: Joycelyn Coombs is a 46 y.o. male. 17 Patient presents with:  Diabetes  Checks sugars some days ! No hypoglycemia   Hypertension  Usually takes meds reg     Out of job now! Last seen  17 Patient presents with:  2 Week Follow-Up: nausea / Diarrhoea / Vomitting . Resolved   Diabetes: only taking am Toujeo . pm Toujeo not possible b/e work . injured toes! Hypertension: taking reg             Last seen  10/26/17 Patient presents with:  Nausea & Vomiting: c/o nausea vomiting and diarrhea for 3 days  Diarrhea: itching          Last seen  17 Patient presents with:  Diabetes: Patient states he has headaches since hitting his  hit his head on a wall 2 weeks ago   Headache: here b/e friends say he is acting 'confused '            Last seen  5/16/17     3/23/16  Established New Doctor:           Diabetes   Pertinent negatives for hypoglycemia include no dizziness, headaches or tremors. Pertinent negatives for diabetes include no blurred vision, no chest pain, no fatigue and no weakness. Diabetic complications include retinopathy. Hypertension   Pertinent negatives include no anxiety, blurred vision, chest pain, headaches or shortness of breath. Hypertensive end-organ damage includes retinopathy. Review of Systems   Constitutional: Negative for chills, fatigue and fever. Flu vac   Tdap     Pneumovac       HENT:        Dental exam >>   Eyes: Negative for blurred vision. Eye exam ; retinopathy. S/P Laser treatment    Respiratory: Negative for chest tightness, shortness of breath and wheezing. Smokes Marijuana !! ; reg beer drinker!!!           Cardiovascular: Negative. Negative for chest pain. HTN >  No known CAD     Father  of CAD in his 45s and a brother too      Gastrointestinal: Negative for blood in stool, constipation, diarrhea, nausea and vomiting.         No FH of  ca colon     Colonoscopy ,  Dr Reza Grew Rosalind Goodman for 10 yrs    Endocrine:        Diabetes 2002   Genitourinary: Negative for dysuria. No FH of ca prostate     One daughter   Skin: Negative for rash. Itching resolved    Allergic/Immunologic: Negative for environmental allergies and food allergies. Neurological: Negative for dizziness, tremors, weakness and headaches. Psychiatric/Behavioral: Negative for behavioral problems and sleep disturbance. Objective:   Physical Exam   Constitutional: He is oriented to person, place, and time. No distress. Eyes: Conjunctivae are normal.   Neck: Neck supple. Cardiovascular: Normal rate, regular rhythm and normal heart sounds. Pulmonary/Chest: Breath sounds normal.   Abdominal: He exhibits distension. There is no tenderness. Musculoskeletal: Normal range of motion. He exhibits no edema. Foot exam 11/17       Neurological: He is alert and oriented to person, place, and time. He has normal strength. Skin: Skin is warm. Psychiatric: He has a normal mood and affect. Vitals reviewed. Assessment:      Rashida Clarke was seen today for diabetes and hypertension. Diagnoses and all orders for this visit:    Essential hypertension  Controlled    ; cont all 3 meds   -     amLODIPine (NORVASC) 10 MG tablet; Take 1 tablet by mouth daily  -     losartan-hydrochlorothiazide (HYZAAR) 100-25 MG per tablet; Take 1 tablet by mouth daily  -     metoprolol tartrate (LOPRESSOR) 50 MG tablet; Take 1 tablet by mouth daily    Diabetes mellitus type 2, insulin dependent ([facility])  Check / record sugars 3 x d   -     insulin glargine (TOUJEO SOLOSTAR) 300 UNIT/ML injection pen; Inject 90 Units into the skin every morning  -     atorvastatin (LIPITOR) 20 MG tablet; Take 1 tablet by mouth daily    Other hyperlipidemia  -     atorvastatin (LIPITOR) 20 MG tablet; Take 1 tablet by mouth daily              N/V/D resolved  DC Metformin  . Recheck UA and Renal function .  Renal Insufficiency lolis    -    Smoker  Needs to

## 2018-01-09 ENCOUNTER — HOSPITAL ENCOUNTER (OUTPATIENT)
Dept: VASCULAR LAB | Age: 53
Discharge: HOME | End: 2018-01-09
Attending: INTERNAL MEDICINE | Admitting: INTERNAL MEDICINE

## 2018-01-09 DIAGNOSIS — I10 ESSENTIAL (PRIMARY) HYPERTENSION: ICD-10-CM

## 2018-01-15 NOTE — TELEPHONE ENCOUNTER
Requested Prescriptions     Pending Prescriptions Disp Refills    ONE TOUCH ULTRA TEST strip [Pharmacy Med Name: ONETOUCH ULTRA TEST STRIPS] 50 strip 5     Sig: UST TO TEST BLOOD SUGAR THREE TIMES A DAY     Last ov 11/30/17

## 2018-03-15 ENCOUNTER — OFFICE VISIT (OUTPATIENT)
Dept: PRIMARY CARE CLINIC | Age: 53
End: 2018-03-15

## 2018-03-15 VITALS
TEMPERATURE: 98.2 F | WEIGHT: 271 LBS | OXYGEN SATURATION: 98 % | DIASTOLIC BLOOD PRESSURE: 70 MMHG | SYSTOLIC BLOOD PRESSURE: 140 MMHG | HEART RATE: 69 BPM | HEIGHT: 70 IN | BODY MASS INDEX: 38.8 KG/M2

## 2018-03-15 DIAGNOSIS — E78.49 OTHER HYPERLIPIDEMIA: ICD-10-CM

## 2018-03-15 DIAGNOSIS — E11.9 DIABETES MELLITUS TYPE 2, INSULIN DEPENDENT (HCC): Primary | ICD-10-CM

## 2018-03-15 DIAGNOSIS — Z23 NEED FOR SHINGLES VACCINE: ICD-10-CM

## 2018-03-15 DIAGNOSIS — I10 ESSENTIAL HYPERTENSION: ICD-10-CM

## 2018-03-15 DIAGNOSIS — Z79.4 DIABETES MELLITUS TYPE 2, INSULIN DEPENDENT (HCC): Primary | ICD-10-CM

## 2018-03-15 LAB — HBA1C MFR BLD: 10.5 %

## 2018-03-15 PROCEDURE — 3046F HEMOGLOBIN A1C LEVEL >9.0%: CPT | Performed by: INTERNAL MEDICINE

## 2018-03-15 PROCEDURE — 3017F COLORECTAL CA SCREEN DOC REV: CPT | Performed by: INTERNAL MEDICINE

## 2018-03-15 PROCEDURE — G8482 FLU IMMUNIZE ORDER/ADMIN: HCPCS | Performed by: INTERNAL MEDICINE

## 2018-03-15 PROCEDURE — 99214 OFFICE O/P EST MOD 30 MIN: CPT | Performed by: INTERNAL MEDICINE

## 2018-03-15 PROCEDURE — G8417 CALC BMI ABV UP PARAM F/U: HCPCS | Performed by: INTERNAL MEDICINE

## 2018-03-15 PROCEDURE — 1036F TOBACCO NON-USER: CPT | Performed by: INTERNAL MEDICINE

## 2018-03-15 PROCEDURE — G8427 DOCREV CUR MEDS BY ELIG CLIN: HCPCS | Performed by: INTERNAL MEDICINE

## 2018-03-15 PROCEDURE — 83036 HEMOGLOBIN GLYCOSYLATED A1C: CPT | Performed by: INTERNAL MEDICINE

## 2018-03-15 RX ORDER — LOSARTAN POTASSIUM AND HYDROCHLOROTHIAZIDE 25; 100 MG/1; MG/1
1 TABLET ORAL DAILY
Qty: 30 TABLET | Refills: 5 | Status: SHIPPED | OUTPATIENT
Start: 2018-03-15 | End: 2018-08-15 | Stop reason: SDUPTHER

## 2018-03-15 RX ORDER — ATORVASTATIN CALCIUM 20 MG/1
20 TABLET, FILM COATED ORAL DAILY
Qty: 30 TABLET | Refills: 6 | Status: SHIPPED | OUTPATIENT
Start: 2018-03-15 | End: 2018-08-15 | Stop reason: SDUPTHER

## 2018-03-15 RX ORDER — METOPROLOL TARTRATE 50 MG/1
50 TABLET, FILM COATED ORAL DAILY
Qty: 30 TABLET | Refills: 3 | Status: SHIPPED | OUTPATIENT
Start: 2018-03-15 | End: 2018-08-15 | Stop reason: SDUPTHER

## 2018-03-15 RX ORDER — AMLODIPINE BESYLATE 10 MG/1
10 TABLET ORAL DAILY
Qty: 30 TABLET | Refills: 5 | Status: SHIPPED | OUTPATIENT
Start: 2018-03-15 | End: 2018-08-15 | Stop reason: SDUPTHER

## 2018-03-15 ASSESSMENT — ENCOUNTER SYMPTOMS
BLURRED VISION: 0
CONSTIPATION: 0
DIARRHEA: 0
CHEST TIGHTNESS: 0
WHEEZING: 0
BLOOD IN STOOL: 0
VOMITING: 0
SHORTNESS OF BREATH: 0
NAUSEA: 0

## 2018-03-15 NOTE — PROGRESS NOTES
Belva Riedel was seen today for diabetes and hypertension. Diagnoses and all orders for this visit:    Diabetes mellitus type 2, insulin dependent ([facility] [address]  A1C jumped up 2 pts . Increase Toujeo to 100 U daily   Check and record sugars atleast 2 x d   -     POCT glycosylated hemoglobin (Hb A1C)  -     insulin glargine (TOUJEO SOLOSTAR) 300 UNIT/ML injection pen; Inject 100 Units into the skin every morning  -     losartan-hydrochlorothiazide (HYZAAR) 100-25 MG per tablet; Take 1 tablet by mouth daily  -     atorvastatin (LIPITOR) 20 MG tablet; Take 1 tablet by mouth daily    Essential hypertension controlled   -     losartan-hydrochlorothiazide (HYZAAR) 100-25 MG per tablet; Take 1 tablet by mouth daily  -     metoprolol tartrate (LOPRESSOR) 50 MG tablet; Take 1 tablet by mouth daily  -     amLODIPine (NORVASC) 10 MG tablet; Take 1 tablet by mouth daily    Other hyperlipidemia    -     atorvastatin (LIPITOR) 20 MG tablet; Take 1 tablet by mouth daily    Need for shingles vaccine  -     zoster recombinant adjuvanted vaccine (SHINGRIX) 50 MCG SUSR injection; Inject 0.5 mLs into the muscle once for 1 dose              -          Retinopathy . S/P   laser treatment . F/U q 3 mths     Alcohol problem drinking . Advised to reduce Beer intake . Plan:      Self Management Goals    Know which medication is for what condition:   Know side effects of medications, and discuss with doctor   Discuss side effects and instructions on new medications. Barriers to medication compliance addressed. All patient questions answered. Pt voiced understanding.   Know correct dose/frequency of medications  Take medications at the same time each day  Stay current on medication refills  If taking OTC's check with MD/pharmacy first about interactions  Monitor sugars and record time/meal  A1C goal 7.0 or less  LDL goal 396 or less  Systolic BP < or equal to 859  Diastolic BP < or equal to 85    Advised  Flu and Pneumonia Vax  Set

## 2018-03-21 RX ORDER — PEN NEEDLE, DIABETIC 31 GX5/16"
NEEDLE, DISPOSABLE MISCELLANEOUS
Qty: 100 EACH | Refills: 5 | Status: SHIPPED | OUTPATIENT
Start: 2018-03-21 | End: 2018-12-19

## 2018-05-20 DIAGNOSIS — E11.69 TYPE 2 DIABETES MELLITUS WITH OTHER SPECIFIED COMPLICATION (HCC): ICD-10-CM

## 2018-05-21 RX ORDER — ASPIRIN 81 MG/1
TABLET ORAL
Qty: 30 TABLET | Refills: 4 | Status: SHIPPED | OUTPATIENT
Start: 2018-05-21 | End: 2018-08-15 | Stop reason: SDUPTHER

## 2018-08-15 ENCOUNTER — OFFICE VISIT (OUTPATIENT)
Dept: PRIMARY CARE CLINIC | Age: 53
End: 2018-08-15

## 2018-08-15 VITALS
HEIGHT: 70 IN | SYSTOLIC BLOOD PRESSURE: 120 MMHG | BODY MASS INDEX: 39.8 KG/M2 | OXYGEN SATURATION: 96 % | TEMPERATURE: 98.5 F | DIASTOLIC BLOOD PRESSURE: 70 MMHG | HEART RATE: 68 BPM | WEIGHT: 278 LBS

## 2018-08-15 DIAGNOSIS — E78.49 OTHER HYPERLIPIDEMIA: ICD-10-CM

## 2018-08-15 DIAGNOSIS — Z79.4 UNCONTROLLED TYPE 2 DIABETES MELLITUS WITH HYPERGLYCEMIA, WITH LONG-TERM CURRENT USE OF INSULIN (HCC): ICD-10-CM

## 2018-08-15 DIAGNOSIS — I10 ESSENTIAL HYPERTENSION: ICD-10-CM

## 2018-08-15 DIAGNOSIS — R63.8 WEIGHT DISORDER: Primary | ICD-10-CM

## 2018-08-15 DIAGNOSIS — E11.65 UNCONTROLLED TYPE 2 DIABETES MELLITUS WITH HYPERGLYCEMIA, WITH LONG-TERM CURRENT USE OF INSULIN (HCC): ICD-10-CM

## 2018-08-15 DIAGNOSIS — R06.02 SHORT OF BREATH ON EXERTION: ICD-10-CM

## 2018-08-15 LAB — HBA1C MFR BLD: 12.1 %

## 2018-08-15 PROCEDURE — 3017F COLORECTAL CA SCREEN DOC REV: CPT | Performed by: INTERNAL MEDICINE

## 2018-08-15 PROCEDURE — 1036F TOBACCO NON-USER: CPT | Performed by: INTERNAL MEDICINE

## 2018-08-15 PROCEDURE — 3046F HEMOGLOBIN A1C LEVEL >9.0%: CPT | Performed by: INTERNAL MEDICINE

## 2018-08-15 PROCEDURE — G8417 CALC BMI ABV UP PARAM F/U: HCPCS | Performed by: INTERNAL MEDICINE

## 2018-08-15 PROCEDURE — 2022F DILAT RTA XM EVC RTNOPTHY: CPT | Performed by: INTERNAL MEDICINE

## 2018-08-15 PROCEDURE — 83036 HEMOGLOBIN GLYCOSYLATED A1C: CPT | Performed by: INTERNAL MEDICINE

## 2018-08-15 PROCEDURE — 99214 OFFICE O/P EST MOD 30 MIN: CPT | Performed by: INTERNAL MEDICINE

## 2018-08-15 PROCEDURE — G8427 DOCREV CUR MEDS BY ELIG CLIN: HCPCS | Performed by: INTERNAL MEDICINE

## 2018-08-15 RX ORDER — LOSARTAN POTASSIUM AND HYDROCHLOROTHIAZIDE 25; 100 MG/1; MG/1
1 TABLET ORAL DAILY
Qty: 30 TABLET | Refills: 5 | Status: SHIPPED | OUTPATIENT
Start: 2018-08-15 | End: 2018-08-30 | Stop reason: ALTCHOICE

## 2018-08-15 RX ORDER — METOPROLOL TARTRATE 50 MG/1
50 TABLET, FILM COATED ORAL DAILY
Qty: 30 TABLET | Refills: 3 | Status: SHIPPED | OUTPATIENT
Start: 2018-08-15 | End: 2018-08-30 | Stop reason: ALTCHOICE

## 2018-08-15 RX ORDER — AMLODIPINE BESYLATE 10 MG/1
10 TABLET ORAL DAILY
Qty: 30 TABLET | Refills: 5 | Status: SHIPPED | OUTPATIENT
Start: 2018-08-15 | End: 2018-08-30 | Stop reason: ALTCHOICE

## 2018-08-15 RX ORDER — ASPIRIN 81 MG/1
81 TABLET ORAL DAILY
Qty: 90 TABLET | Refills: 5 | Status: SHIPPED | OUTPATIENT
Start: 2018-08-15 | End: 2018-12-19 | Stop reason: SDUPTHER

## 2018-08-15 RX ORDER — ATORVASTATIN CALCIUM 20 MG/1
20 TABLET, FILM COATED ORAL DAILY
Qty: 30 TABLET | Refills: 6 | Status: SHIPPED | OUTPATIENT
Start: 2018-08-15 | End: 2018-12-19 | Stop reason: SDUPTHER

## 2018-08-15 ASSESSMENT — ENCOUNTER SYMPTOMS
CONSTIPATION: 0
WHEEZING: 0
VOMITING: 0
BLURRED VISION: 0
CHEST TIGHTNESS: 0
DIARRHEA: 0
BLOOD IN STOOL: 0
SHORTNESS OF BREATH: 1
NAUSEA: 0

## 2018-08-15 ASSESSMENT — PATIENT HEALTH QUESTIONNAIRE - PHQ9
SUM OF ALL RESPONSES TO PHQ9 QUESTIONS 1 & 2: 0
SUM OF ALL RESPONSES TO PHQ QUESTIONS 1-9: 0
SUM OF ALL RESPONSES TO PHQ QUESTIONS 1-9: 0
2. FEELING DOWN, DEPRESSED OR HOPELESS: 0
1. LITTLE INTEREST OR PLEASURE IN DOING THINGS: 0

## 2018-08-15 NOTE — PROGRESS NOTES
Subjective:      Patient ID: Eliza Hernández is a 48 y.o. male. 8/15/18 Patient presents with:  Diabetes  Taking insulin most days ! Dizziness off and on   Shortness of Breath  Rolando on exertion. He could mow the lawn in 15 mins , now it takes much longer . No Chest pain           Last seen  3/15/18 Patient presents with:  Diabetes: states takes Insulin most days . May forgert sometimes! Hypertension: taking pills reg     Not smoking Marijuana any more           Last seen  11/30/17 Patient presents with:  Diabetes  Checks sugars some days ! No hypoglycemia   Hypertension  Usually takes meds reg     Out of job now! Last seen  11/1/17 Patient presents with:  2 Week Follow-Up: nausea / Diarrhoea / Vomitting . Resolved   Diabetes: only taking am Toujeo . pm Toujeo not possible b/e work . injured toes! Hypertension: taking reg             Last seen  10/26/17 Patient presents with:  Nausea & Vomiting: c/o nausea vomiting and diarrhea for 3 days  Diarrhea: itching          Last seen  8/24/17 Patient presents with:  Diabetes: Patient states he has headaches since hitting his  hit his head on a wall 2 weeks ago   Headache: here b/e friends say he is acting 'confused '            Last seen  5/16/17     3/23/16  Established New Doctor:           Diabetes   He presents for his follow-up diabetic visit. He has type 2 diabetes mellitus. Disease course: Does not check sugars regularly. Hypoglycemia symptoms include dizziness. Pertinent negatives for hypoglycemia include no headaches or tremors. Associated symptoms include fatigue. Pertinent negatives for diabetes include no blurred vision, no chest pain and no weakness. There are no hypoglycemic complications. Diabetic complications include retinopathy. Risk factors for coronary artery disease include diabetes mellitus, dyslipidemia, family history, male sex and obesity. Current diabetic treatment includes insulin injections.  He is compliant with treatment some of the time. He rarely participates in exercise. An ACE inhibitor/angiotensin II receptor blocker is being taken. He does not see a podiatrist.Eye exam is current. Hypertension   This is a chronic problem. The problem is controlled. Associated symptoms include shortness of breath. Pertinent negatives include no anxiety, blurred vision, chest pain or headaches. Hypertensive end-organ damage includes retinopathy. Dizziness   Associated symptoms include fatigue. Pertinent negatives include no chest pain, chills, fever, headaches, nausea, rash, vomiting or weakness. Shortness of Breath   Pertinent negatives include no chest pain, fever, headaches, rash, vomiting or wheezing. Review of Systems   Constitutional: Positive for fatigue. Negative for chills and fever. Flu vac   Tdap     Pneumovac       HENT:        Dental exam >>   Eyes: Positive for visual disturbance (blurry). Negative for blurred vision. Eye exam 3/18 ;stable    S/P Laser treatment for retinopathy   Respiratory: Positive for shortness of breath. Negative for chest tightness and wheezing. Stopped Smoking  Marijuana !! ; reg beer drinker!!!           Cardiovascular: Negative. Negative for chest pain. HTN >  No known CAD     Father  of CAD in his 45s and a brother too      Gastrointestinal: Negative for blood in stool, constipation, diarrhea, nausea and vomiting. No FH of  ca colon     Colonoscopy ,  Dr Kristyn Hanley . Good for 10 yrs    Endocrine:        Diabetes    Genitourinary: Negative for dysuria. No FH of ca prostate     One daughter   Skin: Negative for rash. Allergic/Immunologic: Negative for environmental allergies and food allergies. Neurological: Positive for dizziness. Negative for tremors, weakness and headaches. Psychiatric/Behavioral: Negative for behavioral problems and sleep disturbance.        Objective:   Physical Exam   Constitutional: He is oriented to person,

## 2018-08-16 ENCOUNTER — TELEPHONE (OUTPATIENT)
Dept: PRIMARY CARE CLINIC | Age: 53
End: 2018-08-16

## 2018-08-17 RX ORDER — LUBIPROSTONE 8 UG/1
8 CAPSULE, GELATIN COATED ORAL DAILY
Qty: 30 CAPSULE | Refills: 3 | Status: SHIPPED | OUTPATIENT
Start: 2018-08-17 | End: 2018-08-17

## 2018-08-30 ENCOUNTER — OFFICE VISIT (OUTPATIENT)
Dept: CARDIOLOGY CLINIC | Age: 53
End: 2018-08-30

## 2018-08-30 VITALS
DIASTOLIC BLOOD PRESSURE: 74 MMHG | OXYGEN SATURATION: 97 % | WEIGHT: 270 LBS | HEART RATE: 76 BPM | SYSTOLIC BLOOD PRESSURE: 122 MMHG | HEIGHT: 70 IN | BODY MASS INDEX: 38.65 KG/M2

## 2018-08-30 DIAGNOSIS — R06.02 SOB (SHORTNESS OF BREATH): ICD-10-CM

## 2018-08-30 DIAGNOSIS — Z76.89 ENCOUNTER TO ESTABLISH CARE: Primary | ICD-10-CM

## 2018-08-30 DIAGNOSIS — I10 ESSENTIAL HYPERTENSION: ICD-10-CM

## 2018-08-30 DIAGNOSIS — E78.2 MIXED HYPERLIPIDEMIA: ICD-10-CM

## 2018-08-30 DIAGNOSIS — R53.83 OTHER FATIGUE: ICD-10-CM

## 2018-08-30 DIAGNOSIS — E11.9 TYPE 2 DIABETES MELLITUS WITHOUT COMPLICATION, WITHOUT LONG-TERM CURRENT USE OF INSULIN (HCC): ICD-10-CM

## 2018-08-30 PROCEDURE — 3046F HEMOGLOBIN A1C LEVEL >9.0%: CPT | Performed by: INTERNAL MEDICINE

## 2018-08-30 PROCEDURE — 2022F DILAT RTA XM EVC RTNOPTHY: CPT | Performed by: INTERNAL MEDICINE

## 2018-08-30 PROCEDURE — 99205 OFFICE O/P NEW HI 60 MIN: CPT | Performed by: INTERNAL MEDICINE

## 2018-08-30 PROCEDURE — 1036F TOBACCO NON-USER: CPT | Performed by: INTERNAL MEDICINE

## 2018-08-30 PROCEDURE — G8427 DOCREV CUR MEDS BY ELIG CLIN: HCPCS | Performed by: INTERNAL MEDICINE

## 2018-08-30 PROCEDURE — 93000 ELECTROCARDIOGRAM COMPLETE: CPT | Performed by: INTERNAL MEDICINE

## 2018-08-30 PROCEDURE — 3017F COLORECTAL CA SCREEN DOC REV: CPT | Performed by: INTERNAL MEDICINE

## 2018-08-30 PROCEDURE — G8417 CALC BMI ABV UP PARAM F/U: HCPCS | Performed by: INTERNAL MEDICINE

## 2018-08-30 RX ORDER — CHLORTHALIDONE 50 MG/1
50 TABLET ORAL DAILY
COMMUNITY
End: 2018-09-19 | Stop reason: SDUPTHER

## 2018-08-30 RX ORDER — NIFEDIPINE 60 MG/1
60 TABLET, EXTENDED RELEASE ORAL DAILY
COMMUNITY
End: 2018-09-19 | Stop reason: SDUPTHER

## 2018-08-30 RX ORDER — LOSARTAN POTASSIUM 100 MG/1
100 TABLET ORAL DAILY
COMMUNITY
End: 2018-12-19 | Stop reason: SDUPTHER

## 2018-08-30 RX ORDER — CARVEDILOL 25 MG/1
25 TABLET ORAL 2 TIMES DAILY WITH MEALS
COMMUNITY
End: 2018-09-19 | Stop reason: SDUPTHER

## 2018-08-30 RX ORDER — ERGOCALCIFEROL (VITAMIN D2) 1250 MCG
50000 CAPSULE ORAL WEEKLY
COMMUNITY
End: 2019-03-07

## 2018-08-30 RX ORDER — FUROSEMIDE 40 MG/1
40 TABLET ORAL 2 TIMES DAILY
COMMUNITY
End: 2018-12-19

## 2018-08-30 NOTE — PATIENT INSTRUCTIONS
yourself.    Call your doctor now or seek immediate medical care if:    · Your shortness of breath gets worse or you start to wheeze. Wheezing is a high-pitched sound when you breathe.     · You wake up at night out of breath or have to prop your head up on several pillows to breathe.     · You are short of breath after only light activity or while at rest.    Watch closely for changes in your health, and be sure to contact your doctor if:    · You do not get better over the next 1 to 2 days. Where can you learn more? Go to https://Mobile Experiencepepiceweb.[employer]. org and sign in to your EDUS account. Enter S780 in the Profista box to learn more about \"Shortness of Breath: Care Instructions. \"     If you do not have an account, please click on the \"Sign Up Now\" link. Current as of: December 6, 2017  Content Version: 11.7  © 1038-7348 [employer]. Care instructions adapted under license by [facility] ([facility]). If you have questions about a medical condition or this instruction, always ask your healthcare professional. [place] [address] any warranty or liability for your use of this information. Patient Education        Sleep Apnea: Care Instructions  Your Care Instructions    Sleep apnea means that you frequently stop breathing for 10 seconds or longer during sleep. It can be mild to severe, based on the number of times an hour that you stop breathing or have slowed breathing. Blocked or narrowed airways in your nose, mouth, or throat can cause sleep apnea. Your airway can become blocked when your throat muscles and tongue relax during sleep. You can treat sleep apnea at home by making lifestyle changes. You also can use a CPAP breathing machine that keeps tissues in the throat from blocking your airway. Or your doctor may suggest that you use a breathing device while you sleep. It helps keep your airway open. This could be a device that you put in your mouth.  Other help.  ¨ You are sleepy during the day and it gets in the way of the normal things you do. Do not drive when you are drowsy. When should you call for help? Watch closely for changes in your health, and be sure to contact your doctor if:    · You still have sleep apnea even though you have made lifestyle changes.     · You are thinking of trying a device such as CPAP.     · You are having problems using a CPAP or similar machine. Where can you learn more? Go to https://E[employer] Energy.PrairieSmarts. org and sign in to your MyKontiki ([employer] account. Enter D427 in the CloudSplit box to learn more about \"Sleep Apnea: Care Instructions. \"     If you do not have an account, please click on the \"Sign Up Now\" link. Current as of: December 6, 2017  Content Version: 11.7  © 1845-6145 [employer]. Care instructions adapted under license by [facility] ([facility]). If you have questions about a medical condition or this instruction, always ask your healthcare professional. [address] any warranty or liability for your use of this information.

## 2018-08-30 NOTE — PROGRESS NOTES
[address]  Cardiology Consult Note      Rachel Candelaria  1965, 48 y.o.      CC: SOB Duane [place], MD:      HPI:   This is a 48 y.o. male with a PMH significant for HTN, HLD and DM II. Here as a new patient for evaluation of shortness of breath for the last 2-3 weeks. Patient says he noticed this when he tried to cut the grass and it took longer than usual. He was winded and had to take frequent breaks. Weight has been stable. Also reports swelling of his feet at times. Gets fatigue with little activity. Says he has been told that he stops breathing while sleeping. Taking all medication as prescribed. Today, denies any chest pain, palpitations, dizziness or lightheadedness.        Past Medical History:   Diagnosis Date    HLD (hyperlipidemia)     Hypertension     Type II or unspecified type diabetes mellitus without mention of complication, not stated as uncontrolled       Past Surgical History:   Procedure Laterality Date    COLONOSCOPY  4-12-16    Dr Hodan Florentino      abdominal      Family History   Problem Relation Age of Onset    High Blood Pressure Mother     Diabetes Father       Social History   Substance Use Topics    Smoking status: Former Smoker    Smokeless tobacco: Former User    Alcohol use Yes      Comment: socially on weekends     No Known Allergies      Review of Systems -   Constitutional: Negative for weight gain/loss; malaise, fever  Respiratory: Negative for Asthma;  cough and hemoptysis  Cardiovascular: Negative for palpitations,dizziness   Gastrointestinal: Negative for abd.pain; constipation/diarrhea;    Genitourinary: Negative for stones; hematuria; frequency hesitancy  Integumentt: Negative for rash or pruritis  Hematologic/lymphatic: Negative for blood dyscrasia; leukemia/lymphoma  Musculoskeletal: Negative for Connective tissue disease  Neurological:  Negative for Seizure   Behavioral/Psych:Negative for Bipolar disorder, Schizophrenia; 1 tablet by mouth every 6 hours as needed for Pain 30 tablet 1    glucose blood VI test strips (ONE TOUCH TEST STRIPS) strip 1 each by In Vitro route 3 times daily One touch Ultra  DX: E11.65 and E11.9 150 each 6    glucose blood VI test strips (ACCU-CHEK NICKY PLUS) strip TEST 4 TO 6 TIMES DAILY 150 each 2    Blood Glucose Monitoring Suppl JOSE RAMON 1 Device by Does not apply route 3 times daily as needed One touch Ultra  DX: E11.65 and E11.9 1 Device 0    Accu-Chek Multiclix Lancets MISC TEST 4 TIMES A  each 10         Labs:   Lab Results   Component Value Date    HDL 49 2017    HDL 66 10/04/2010    LDLCALC 61 2017    TRIG 83 2017         EK18, sinus rhythm      ASSESSMENT AND PLAN:      Dyspnea / Fatigue  Progressively worsening over the last 2-3 weeks. Noticed when cutting grass. Also tired with no activity. Weight has been stable. Unsure to the cause  Will get an echo and stress to rule our cardiac cause. Sleep apnea  Says he has been told he stops breathing while sleeping. I will refer him for a sleep study, echocardiogram and stress test to further evaluate. HTN  BP is controlled. Has swelling in his feet; likely from Nifedipine. Continue current regimen for now. HLD  On statin therapy. Excellent control. DM II  Managed by PCP; on insulin therapy. Follow up in 1 months. Thank you very much for allowing me to participate in the care of your patient. Please do not hesitate to contact me if you have any questions. Sincerely,    Mayank Castaneda M.D  [address] [place] [address], [address] [place] [address]  Ph: [phone]  Fax: [phone]    This note was scribed in the presence of Dr. Mayank Castaneda MD by Josh Veronica  Physician Attestation:  The scribes documentation has been prepared under my direction and personally reviewed by me in its entirety.      I confirm that the note above accurately reflects all work, treatment, procedures, and medical decision making performed by me.

## 2018-09-05 ENCOUNTER — TELEPHONE (OUTPATIENT)
Dept: PRIMARY CARE CLINIC | Age: 53
End: 2018-09-05

## 2018-09-05 NOTE — TELEPHONE ENCOUNTER
Called patient left message to sugars much  improved cont [id number] units daily Extenatide weekly donot skip meals glucose tracker scanned in media

## 2018-09-11 ENCOUNTER — TELEPHONE (OUTPATIENT)
Dept: PRIMARY CARE CLINIC | Age: 53
End: 2018-09-11

## 2018-09-18 ENCOUNTER — HOSPITAL ENCOUNTER (OUTPATIENT)
Dept: NON INVASIVE DIAGNOSTICS | Age: 53
Discharge: HOME | End: 2018-09-18
Admitting: INTERNAL MEDICINE

## 2018-09-19 ENCOUNTER — OFFICE VISIT (OUTPATIENT)
Dept: PRIMARY CARE CLINIC | Age: 53
End: 2018-09-19

## 2018-09-19 VITALS
BODY MASS INDEX: 39.65 KG/M2 | HEIGHT: 70 IN | OXYGEN SATURATION: 97 % | SYSTOLIC BLOOD PRESSURE: 115 MMHG | TEMPERATURE: 98.2 F | HEART RATE: 81 BPM | DIASTOLIC BLOOD PRESSURE: 70 MMHG | WEIGHT: 277 LBS

## 2018-09-19 DIAGNOSIS — Z23 FLU VACCINE NEED: ICD-10-CM

## 2018-09-19 DIAGNOSIS — Z79.4 UNCONTROLLED TYPE 2 DIABETES MELLITUS WITH HYPERGLYCEMIA, WITH LONG-TERM CURRENT USE OF INSULIN (HCC): ICD-10-CM

## 2018-09-19 DIAGNOSIS — E11.65 UNCONTROLLED TYPE 2 DIABETES MELLITUS WITH HYPERGLYCEMIA, WITH LONG-TERM CURRENT USE OF INSULIN (HCC): ICD-10-CM

## 2018-09-19 DIAGNOSIS — I10 ESSENTIAL HYPERTENSION: ICD-10-CM

## 2018-09-19 PROCEDURE — 2022F DILAT RTA XM EVC RTNOPTHY: CPT | Performed by: INTERNAL MEDICINE

## 2018-09-19 PROCEDURE — 90471 IMMUNIZATION ADMIN: CPT | Performed by: INTERNAL MEDICINE

## 2018-09-19 PROCEDURE — 3017F COLORECTAL CA SCREEN DOC REV: CPT | Performed by: INTERNAL MEDICINE

## 2018-09-19 PROCEDURE — G8417 CALC BMI ABV UP PARAM F/U: HCPCS | Performed by: INTERNAL MEDICINE

## 2018-09-19 PROCEDURE — 3046F HEMOGLOBIN A1C LEVEL >9.0%: CPT | Performed by: INTERNAL MEDICINE

## 2018-09-19 PROCEDURE — 1036F TOBACCO NON-USER: CPT | Performed by: INTERNAL MEDICINE

## 2018-09-19 PROCEDURE — G8427 DOCREV CUR MEDS BY ELIG CLIN: HCPCS | Performed by: INTERNAL MEDICINE

## 2018-09-19 PROCEDURE — 90686 IIV4 VACC NO PRSV 0.5 ML IM: CPT | Performed by: INTERNAL MEDICINE

## 2018-09-19 PROCEDURE — 99214 OFFICE O/P EST MOD 30 MIN: CPT | Performed by: INTERNAL MEDICINE

## 2018-09-19 RX ORDER — NIFEDIPINE 60 MG/1
60 TABLET, EXTENDED RELEASE ORAL DAILY
Qty: 30 TABLET | Refills: 5 | Status: SHIPPED | OUTPATIENT
Start: 2018-09-19 | End: 2018-12-19 | Stop reason: SDUPTHER

## 2018-09-19 RX ORDER — CHLORTHALIDONE 50 MG/1
50 TABLET ORAL DAILY
Qty: 30 TABLET | Refills: 5 | Status: SHIPPED | OUTPATIENT
Start: 2018-09-19 | End: 2018-12-19 | Stop reason: SDUPTHER

## 2018-09-19 RX ORDER — CARVEDILOL 25 MG/1
25 TABLET ORAL 2 TIMES DAILY WITH MEALS
Qty: 60 TABLET | Refills: 5 | Status: SHIPPED | OUTPATIENT
Start: 2018-09-19 | End: 2018-12-19 | Stop reason: SDUPTHER

## 2018-09-19 ASSESSMENT — ENCOUNTER SYMPTOMS
VOMITING: 0
CHEST TIGHTNESS: 0
CONSTIPATION: 0
NAUSEA: 0
DIARRHEA: 0
SHORTNESS OF BREATH: 1
BLOOD IN STOOL: 0
WHEEZING: 0
BLURRED VISION: 0

## 2018-09-19 NOTE — PROGRESS NOTES
coronary artery disease include diabetes mellitus, dyslipidemia, family history, male sex and obesity. Current diabetic treatment includes insulin injections. He is compliant with treatment some of the time. He rarely participates in exercise. An ACE inhibitor/angiotensin II receptor blocker is being taken. He does not see a podiatrist.Eye exam is current. Dizziness   Associated symptoms include fatigue. Pertinent negatives include no chest pain, chills, fever, headaches, nausea, rash, vomiting or weakness. Shortness of Breath   Pertinent negatives include no chest pain, fever, headaches, rash, vomiting or wheezing. Hypertension   This is a chronic problem. The problem is controlled. Associated symptoms include shortness of breath. Pertinent negatives include no anxiety, blurred vision, chest pain or headaches. Hypertensive end-organ damage includes retinopathy. Review of Systems   Constitutional: Positive for fatigue. Negative for chills and fever. Flu vac   Tdap     Pneumovac      Shingrex      HENT:        Dental exam >>   Eyes: Negative for blurred vision. Visual disturbance: blurry. Eye exam 3/18 ;stable    S/P Laser treatment for retinopathy   Respiratory: Positive for shortness of breath. Negative for chest tightness and wheezing. Stopped Smoking  Marijuana !! ; reg beer drinker!!!           Cardiovascular: Negative. Negative for chest pain. HTN >  No known CAD     Father  of CAD in his 45s and a brother too      Gastrointestinal: Negative for blood in stool, constipation, diarrhea, nausea and vomiting. No FH of  ca colon     Colonoscopy ,  Dr Radha Skinner . Good for 10 yrs    Endocrine:        Diabetes    Genitourinary: Negative for dysuria. No FH of ca prostate     One daughter   Skin: Negative for rash. Allergic/Immunologic: Negative for environmental allergies and food allergies.    Neurological: Negative for tremors, weakness and headaches. Psychiatric/Behavioral: Negative for behavioral problems and sleep disturbance. Objective:   Physical Exam   Constitutional: He is oriented to person, place, and time. He appears well-nourished. No distress. Eyes: Conjunctivae are normal.   Neck: Neck supple. Cardiovascular: Normal rate, regular rhythm and normal heart sounds. Pulmonary/Chest: Breath sounds normal.   Abdominal: He exhibits distension. There is no tenderness. Musculoskeletal: Normal range of motion. He exhibits no edema. Foot exam 11/17       Neurological: He is alert and oriented to person, place, and time. He has normal strength. Skin: Skin is warm. Psychiatric: He has a normal mood and affect. His behavior is normal.   Vitals reviewed. Assessment:      Destiny Brenner was seen today for shortness of breath, hypertension and diabetes. Diagnoses and all orders for this visit:    Essential hypertension  BP controlled on new meds . Cont same   -     carvedilol (COREG) 25 MG tablet; Take 1 tablet by mouth 2 times daily (with meals)  -     chlorthalidone (HYGROTEN) 50 MG tablet; Take 1 tablet by mouth daily  -     NIFEdipine (PROCARDIA XL) 60 MG extended release tablet; Take 1 tablet by mouth daily    Uncontrolled type 2 diabetes mellitus with hyperglycemia, with long-term current use of insulin ([facility]) last A1C 12.1   Cont Toujeo 120 U + will find out which GLP1 Agonist covered on  Insurance  -     insulin glargine (TOUJEO SOLOSTAR) 300 UNIT/ML injection pen; Inject 120 Units into the skin every morning    Flu vaccine need    -     INFLUENZA, QUADV, 3 YRS AND OLDER, IM, PF, PREFILL SYR OR SDV, 0.5ML (FLUZONE QUADV, PF)      Weight disorder  Advised Reduced carb diet     Short of breath on exertion    Cardiac w/u WNL . Waiting to see Pulmonary   -      Other hyperlipidemia  -     atorvastatin (LIPITOR) 20 MG tablet; Take 1 tablet by mouth daily      Retinopathy . S/P   laser treatment .  F/U q 3 mths

## 2018-09-24 ENCOUNTER — OFFICE VISIT (OUTPATIENT)
Dept: SLEEP MEDICINE | Age: 53
End: 2018-09-24
Payer: MEDICAID

## 2018-09-24 VITALS
TEMPERATURE: 98.6 F | WEIGHT: 275.4 LBS | HEIGHT: 69 IN | DIASTOLIC BLOOD PRESSURE: 72 MMHG | SYSTOLIC BLOOD PRESSURE: 118 MMHG | HEART RATE: 75 BPM | RESPIRATION RATE: 18 BRPM | OXYGEN SATURATION: 96 % | BODY MASS INDEX: 40.79 KG/M2

## 2018-09-24 DIAGNOSIS — E11.9 TYPE 2 DIABETES MELLITUS WITHOUT COMPLICATION, WITHOUT LONG-TERM CURRENT USE OF INSULIN (HCC): ICD-10-CM

## 2018-09-24 DIAGNOSIS — I10 ESSENTIAL HYPERTENSION: ICD-10-CM

## 2018-09-24 DIAGNOSIS — G47.33 OSA (OBSTRUCTIVE SLEEP APNEA): Primary | ICD-10-CM

## 2018-09-24 PROCEDURE — G8417 CALC BMI ABV UP PARAM F/U: HCPCS | Performed by: PSYCHIATRY & NEUROLOGY

## 2018-09-24 PROCEDURE — 99204 OFFICE O/P NEW MOD 45 MIN: CPT | Performed by: PSYCHIATRY & NEUROLOGY

## 2018-09-24 PROCEDURE — 3017F COLORECTAL CA SCREEN DOC REV: CPT | Performed by: PSYCHIATRY & NEUROLOGY

## 2018-09-24 PROCEDURE — 2022F DILAT RTA XM EVC RTNOPTHY: CPT | Performed by: PSYCHIATRY & NEUROLOGY

## 2018-09-24 PROCEDURE — 1036F TOBACCO NON-USER: CPT | Performed by: PSYCHIATRY & NEUROLOGY

## 2018-09-24 PROCEDURE — 3046F HEMOGLOBIN A1C LEVEL >9.0%: CPT | Performed by: PSYCHIATRY & NEUROLOGY

## 2018-09-24 PROCEDURE — G8427 DOCREV CUR MEDS BY ELIG CLIN: HCPCS | Performed by: PSYCHIATRY & NEUROLOGY

## 2018-09-24 ASSESSMENT — SLEEP AND FATIGUE QUESTIONNAIRES
HOW LIKELY ARE YOU TO NOD OFF OR FALL ASLEEP WHILE SITTING QUIETLY AFTER LUNCH WITHOUT ALCOHOL: 1
NECK CIRCUMFERENCE (INCHES): 17.75
HOW LIKELY ARE YOU TO NOD OFF OR FALL ASLEEP WHILE LYING DOWN TO REST IN THE AFTERNOON WHEN CIRCUMSTANCES PERMIT: 3
HOW LIKELY ARE YOU TO NOD OFF OR FALL ASLEEP IN A CAR, WHILE STOPPED FOR A FEW MINUTES IN TRAFFIC: 0
HOW LIKELY ARE YOU TO NOD OFF OR FALL ASLEEP WHEN YOU ARE A PASSENGER IN A CAR FOR AN HOUR WITHOUT A BREAK: 1
ESS TOTAL SCORE: 11
HOW LIKELY ARE YOU TO NOD OFF OR FALL ASLEEP WHILE SITTING AND READING: 3
HOW LIKELY ARE YOU TO NOD OFF OR FALL ASLEEP WHILE WATCHING TV: 2
HOW LIKELY ARE YOU TO NOD OFF OR FALL ASLEEP WHILE SITTING INACTIVE IN A PUBLIC PLACE: 0
HOW LIKELY ARE YOU TO NOD OFF OR FALL ASLEEP WHILE SITTING AND TALKING TO SOMEONE: 1

## 2018-09-24 ASSESSMENT — ENCOUNTER SYMPTOMS
SHORTNESS OF BREATH: 1
GASTROINTESTINAL NEGATIVE: 1
EYES NEGATIVE: 1
ALLERGIC/IMMUNOLOGIC NEGATIVE: 1
CHOKING: 1

## 2018-09-24 NOTE — PATIENT INSTRUCTIONS
humidifier may help. · If your nose is runny or stuffy from using a breathing machine, talk with your doctor before using medicines to relieve congestion. · Talk to your doctor if you are sleepy during the day and it gets in the way of the normal things you do. Do not drive when you are drowsy. When should you call for help? Watch closely for changes in your health, and be sure to contact your doctor if you have any problems. Where can you learn more? Go to [email or web address] and sign in to your imbookin ([employer]) account. Enter A820 in the [employer] box to learn more about \"Heart Failure and Sleep Apnea: Care Instructions. \"     If you do not have an account, please click on the \"Sign Up Now\" link. Current as of: December 6, 2017  Content Version: 11.7  © 2294-9968 [employer]. Care instructions adapted under license by [facility] ([facility]). If you have questions about a medical condition or this instruction, always ask your healthcare professional. [address] any warranty or liability for your use of this information.

## 2018-09-27 ENCOUNTER — OFFICE VISIT (OUTPATIENT)
Dept: CARDIOLOGY CLINIC | Age: 53
End: 2018-09-27
Payer: MEDICAID

## 2018-09-27 VITALS
WEIGHT: 274 LBS | SYSTOLIC BLOOD PRESSURE: 118 MMHG | HEART RATE: 69 BPM | BODY MASS INDEX: 40.58 KG/M2 | OXYGEN SATURATION: 95 % | HEIGHT: 69 IN | DIASTOLIC BLOOD PRESSURE: 64 MMHG

## 2018-09-27 DIAGNOSIS — I10 ESSENTIAL HYPERTENSION: Primary | ICD-10-CM

## 2018-09-27 DIAGNOSIS — E78.2 MIXED HYPERLIPIDEMIA: ICD-10-CM

## 2018-09-27 PROCEDURE — G8417 CALC BMI ABV UP PARAM F/U: HCPCS | Performed by: INTERNAL MEDICINE

## 2018-09-27 PROCEDURE — 1036F TOBACCO NON-USER: CPT | Performed by: INTERNAL MEDICINE

## 2018-09-27 PROCEDURE — 3017F COLORECTAL CA SCREEN DOC REV: CPT | Performed by: INTERNAL MEDICINE

## 2018-09-27 PROCEDURE — G8427 DOCREV CUR MEDS BY ELIG CLIN: HCPCS | Performed by: INTERNAL MEDICINE

## 2018-09-27 PROCEDURE — 99213 OFFICE O/P EST LOW 20 MIN: CPT | Performed by: INTERNAL MEDICINE

## 2018-09-27 NOTE — PROGRESS NOTES
[facility]  Cardiology Consult Note      Daniel Rainey  1965, 48 y.o.      CC: JUANA Bhakta MD:      HPI:   This is a 48 y.o. male with a PMH significant for HTN, HLD and DM II. She was referred to was for evaluation of shortness of breath. Patient says he noticed this when he tried to cut the grass and it took longer than usual. He was winded and had to take frequent breaks. Weight has been stable. Also reports swelling of his feet at times. Gets fatigue with little activity. Says he has been told that he stops breathing while sleeping. Taking all medication as prescribed. On his last visit, I was not very concerned of CAD at the cause of his shortness of breath. I felt it was probably related to his weight gain and deconditioning. Nevertheless stress test was ordered, which is negative for ischemia. He did not get an echo done. The swelling in his feet. It was due to nifedipine is now resolved since he is taking Lasix.       Past Medical History:   Diagnosis Date    HLD (hyperlipidemia)     Hypertension     Type II or unspecified type diabetes mellitus without mention of complication, not stated as uncontrolled       Past Surgical History:   Procedure Laterality Date    COLONOSCOPY  4-12-16    Dr Ced Christianson      abdominal      Family History   Problem Relation Age of Onset    High Blood Pressure Mother     Diabetes Father       Social History   Substance Use Topics    Smoking status: Former Smoker    Smokeless tobacco: Former User    Alcohol use Yes      Comment: socially on weekends     No Known Allergies      Review of Systems -   Constitutional: Negative for weight gain/loss; malaise, fever  Respiratory: Negative for Asthma;  cough and hemoptysis  Cardiovascular: Negative for palpitations,dizziness   Gastrointestinal: Negative for abd.pain; constipation/diarrhea;    Genitourinary: Negative for stones; hematuria; frequency questions. Sincerely,    Lauren Hill M.D  [facility], [address] [facility] CenterSpeedy morenoJonathan V[place] [address]  Ph: [phone]  Fax: [phone]    This note was scribed in the presence of Dr. Lauren Hill MD by Halima Huff, RN  Physician Attestation:  The scribes documentation has been prepared under my direction and personally reviewed by me in its entirety. I confirm that the note above accurately reflects all work, treatment, procedures, and medical decision making performed by me.

## 2018-10-08 ENCOUNTER — HOSPITAL ENCOUNTER (OUTPATIENT)
Dept: SLEEP CENTER | Age: 53
Discharge: HOME | End: 2018-10-08
Payer: MEDICAID

## 2018-10-08 DIAGNOSIS — E11.9 TYPE 2 DIABETES MELLITUS WITHOUT COMPLICATION, WITHOUT LONG-TERM CURRENT USE OF INSULIN (HCC): ICD-10-CM

## 2018-10-08 DIAGNOSIS — I10 ESSENTIAL HYPERTENSION: ICD-10-CM

## 2018-10-08 DIAGNOSIS — G47.33 OSA (OBSTRUCTIVE SLEEP APNEA): ICD-10-CM

## 2018-10-08 PROCEDURE — 95810 POLYSOM 6/> YRS 4/> PARAM: CPT

## 2018-10-08 PROCEDURE — 95811 POLYSOM 6/>YRS CPAP 4/> PARM: CPT

## 2018-10-09 PROCEDURE — 95811 POLYSOM 6/>YRS CPAP 4/> PARM: CPT | Performed by: PSYCHIATRY & NEUROLOGY

## 2018-10-11 ENCOUNTER — TELEPHONE (OUTPATIENT)
Dept: PULMONOLOGY | Age: 53
End: 2018-10-11

## 2018-12-19 ENCOUNTER — OFFICE VISIT (OUTPATIENT)
Dept: PRIMARY CARE CLINIC | Age: 53
End: 2018-12-19
Payer: MEDICAID

## 2018-12-19 VITALS
WEIGHT: 277 LBS | HEIGHT: 69 IN | DIASTOLIC BLOOD PRESSURE: 80 MMHG | OXYGEN SATURATION: 98 % | SYSTOLIC BLOOD PRESSURE: 130 MMHG | TEMPERATURE: 97.8 F | HEART RATE: 73 BPM | BODY MASS INDEX: 41.03 KG/M2

## 2018-12-19 DIAGNOSIS — E11.65 UNCONTROLLED TYPE 2 DIABETES MELLITUS WITH HYPERGLYCEMIA, WITH LONG-TERM CURRENT USE OF INSULIN (HCC): Primary | ICD-10-CM

## 2018-12-19 DIAGNOSIS — E78.49 OTHER HYPERLIPIDEMIA: ICD-10-CM

## 2018-12-19 DIAGNOSIS — I10 ESSENTIAL HYPERTENSION: ICD-10-CM

## 2018-12-19 DIAGNOSIS — N28.9 RENAL INSUFFICIENCY: ICD-10-CM

## 2018-12-19 DIAGNOSIS — Z79.4 UNCONTROLLED TYPE 2 DIABETES MELLITUS WITH HYPERGLYCEMIA, WITH LONG-TERM CURRENT USE OF INSULIN (HCC): Primary | ICD-10-CM

## 2018-12-19 LAB — HBA1C MFR BLD: 9.8 %

## 2018-12-19 PROCEDURE — G8417 CALC BMI ABV UP PARAM F/U: HCPCS | Performed by: INTERNAL MEDICINE

## 2018-12-19 PROCEDURE — G8482 FLU IMMUNIZE ORDER/ADMIN: HCPCS | Performed by: INTERNAL MEDICINE

## 2018-12-19 PROCEDURE — G8427 DOCREV CUR MEDS BY ELIG CLIN: HCPCS | Performed by: INTERNAL MEDICINE

## 2018-12-19 PROCEDURE — 1036F TOBACCO NON-USER: CPT | Performed by: INTERNAL MEDICINE

## 2018-12-19 PROCEDURE — 3046F HEMOGLOBIN A1C LEVEL >9.0%: CPT | Performed by: INTERNAL MEDICINE

## 2018-12-19 PROCEDURE — 2022F DILAT RTA XM EVC RTNOPTHY: CPT | Performed by: INTERNAL MEDICINE

## 2018-12-19 PROCEDURE — 3017F COLORECTAL CA SCREEN DOC REV: CPT | Performed by: INTERNAL MEDICINE

## 2018-12-19 PROCEDURE — 99214 OFFICE O/P EST MOD 30 MIN: CPT | Performed by: INTERNAL MEDICINE

## 2018-12-19 PROCEDURE — 83036 HEMOGLOBIN GLYCOSYLATED A1C: CPT | Performed by: INTERNAL MEDICINE

## 2018-12-19 RX ORDER — LOSARTAN POTASSIUM 100 MG/1
100 TABLET ORAL DAILY
Qty: 30 TABLET | Refills: 5 | Status: SHIPPED | OUTPATIENT
Start: 2018-12-19 | End: 2019-03-07 | Stop reason: SDUPTHER

## 2018-12-19 RX ORDER — CHLORTHALIDONE 50 MG/1
50 TABLET ORAL DAILY
Qty: 30 TABLET | Refills: 5 | Status: SHIPPED | OUTPATIENT
Start: 2018-12-19 | End: 2019-03-07 | Stop reason: SDUPTHER

## 2018-12-19 RX ORDER — CARVEDILOL 25 MG/1
25 TABLET ORAL 2 TIMES DAILY WITH MEALS
Qty: 60 TABLET | Refills: 5 | Status: SHIPPED | OUTPATIENT
Start: 2018-12-19 | End: 2019-03-07 | Stop reason: SDUPTHER

## 2018-12-19 RX ORDER — NIFEDIPINE 60 MG/1
60 TABLET, EXTENDED RELEASE ORAL DAILY
Qty: 30 TABLET | Refills: 5 | Status: SHIPPED | OUTPATIENT
Start: 2018-12-19 | End: 2019-03-07 | Stop reason: SDUPTHER

## 2018-12-19 RX ORDER — ASPIRIN 81 MG/1
81 TABLET ORAL DAILY
Qty: 90 TABLET | Refills: 5 | Status: SHIPPED | OUTPATIENT
Start: 2018-12-19 | End: 2019-03-07 | Stop reason: SDUPTHER

## 2018-12-19 RX ORDER — ATORVASTATIN CALCIUM 20 MG/1
20 TABLET, FILM COATED ORAL DAILY
Qty: 30 TABLET | Refills: 6 | Status: SHIPPED | OUTPATIENT
Start: 2018-12-19 | End: 2019-03-07 | Stop reason: SDUPTHER

## 2018-12-19 ASSESSMENT — ENCOUNTER SYMPTOMS
CONSTIPATION: 0
BLURRED VISION: 0
WHEEZING: 0
DIARRHEA: 0
BLOOD IN STOOL: 0
CHEST TIGHTNESS: 0
NAUSEA: 0
VOMITING: 0

## 2018-12-19 NOTE — PROGRESS NOTES
artery disease include diabetes mellitus, dyslipidemia, family history, male sex and obesity. Current diabetic treatment includes insulin injections. He is compliant with treatment some of the time. He rarely participates in exercise. An ACE inhibitor/angiotensin II receptor blocker is being taken. He does not see a podiatrist.Eye exam is current. Hypertension   This is a chronic problem. The problem is controlled. Pertinent negatives include no anxiety, blurred vision, chest pain or headaches. Risk factors for coronary artery disease include obesity, diabetes mellitus, dyslipidemia and sedentary lifestyle. Review of Systems   Constitutional: Negative for chills and fever. Flu vac   Tdap     Pneumovac      Shingrex      HENT:        Dental exam >>   Eyes: Negative for blurred vision. Visual disturbance: blurry. Eye exam 3/18 ;stable    S/P Laser treatment for retinopathy   Respiratory: Negative for chest tightness and wheezing. Stopped Smoking  Marijuana !! ; reg beer drinker!!!           Cardiovascular: Negative. Negative for chest pain. HTN >  No known CAD     Father  of CAD in his 45s and a brother too      Gastrointestinal: Negative for blood in stool, constipation, diarrhea, nausea and vomiting. No FH of  ca colon     Colonoscopy ,  Dr Bob Cook . Good for 10 yrs    Endocrine:        Diabetes    Genitourinary: Negative for dysuria. No FH of ca prostate     One daughter   Skin: Negative for rash. Allergic/Immunologic: Negative for environmental allergies and food allergies. Neurological: Negative for tremors, weakness and headaches. Psychiatric/Behavioral: Negative for behavioral problems and sleep disturbance. Objective:   Physical Exam   Constitutional: He is oriented to person, place, and time. No distress. Eyes: Conjunctivae are normal.   Neck: Neck supple.    Cardiovascular: Normal rate, regular rhythm and normal Cardiac w/u WNL . Waiting to see Pulmonary   -      Retinopathy . S/P   laser treatment . F/U q 3 mths     Alcohol problem drinking . Advised to reduce Beer intake . Plan:      Self Management Goals    Know which medication is for what condition:   Know side effects of medications, and discuss with doctor   Discuss side effects and instructions on new medications. Barriers to medication compliance addressed. All patient questions answered. Pt voiced understanding. Know correct dose/frequency of medications  Take medications at the same time each day  Stay current on medication refills  If taking OTC's check with MD/pharmacy first about interactions  Monitor sugars and record time/meal  A1C goal 7.0 or less  LDL goal 016 or less  Systolic BP < or equal to 113  Diastolic BP < or equal to 85    Advised  Flu and Pneumonia Vax  Set targets for weight loss 4 lbs per month  Exercise 3-5 times per week  Keep check of weight  Weighting machine    On a scale of 1 to 5 how confident are you in these goals?   3/5    Education materials given   Diabetic Education Referral   [phone]

## 2019-03-07 ENCOUNTER — OFFICE VISIT (OUTPATIENT)
Dept: PRIMARY CARE CLINIC | Age: 54
End: 2019-03-07
Payer: MEDICAID

## 2019-03-07 VITALS
DIASTOLIC BLOOD PRESSURE: 80 MMHG | WEIGHT: 288 LBS | HEART RATE: 70 BPM | BODY MASS INDEX: 42.53 KG/M2 | SYSTOLIC BLOOD PRESSURE: 120 MMHG

## 2019-03-07 DIAGNOSIS — R63.8 WEIGHT DISORDER: ICD-10-CM

## 2019-03-07 DIAGNOSIS — Z79.4 UNCONTROLLED TYPE 2 DIABETES MELLITUS WITH HYPERGLYCEMIA, WITH LONG-TERM CURRENT USE OF INSULIN (HCC): ICD-10-CM

## 2019-03-07 DIAGNOSIS — E78.49 OTHER HYPERLIPIDEMIA: ICD-10-CM

## 2019-03-07 DIAGNOSIS — E11.65 UNCONTROLLED TYPE 2 DIABETES MELLITUS WITH HYPERGLYCEMIA, WITH LONG-TERM CURRENT USE OF INSULIN (HCC): ICD-10-CM

## 2019-03-07 DIAGNOSIS — I10 ESSENTIAL HYPERTENSION: ICD-10-CM

## 2019-03-07 LAB — HBA1C MFR BLD: 10.8 %

## 2019-03-07 PROCEDURE — 1036F TOBACCO NON-USER: CPT | Performed by: INTERNAL MEDICINE

## 2019-03-07 PROCEDURE — 99214 OFFICE O/P EST MOD 30 MIN: CPT | Performed by: INTERNAL MEDICINE

## 2019-03-07 PROCEDURE — 2022F DILAT RTA XM EVC RTNOPTHY: CPT | Performed by: INTERNAL MEDICINE

## 2019-03-07 PROCEDURE — 3017F COLORECTAL CA SCREEN DOC REV: CPT | Performed by: INTERNAL MEDICINE

## 2019-03-07 PROCEDURE — 83036 HEMOGLOBIN GLYCOSYLATED A1C: CPT | Performed by: INTERNAL MEDICINE

## 2019-03-07 PROCEDURE — G8482 FLU IMMUNIZE ORDER/ADMIN: HCPCS | Performed by: INTERNAL MEDICINE

## 2019-03-07 PROCEDURE — 3046F HEMOGLOBIN A1C LEVEL >9.0%: CPT | Performed by: INTERNAL MEDICINE

## 2019-03-07 PROCEDURE — G8417 CALC BMI ABV UP PARAM F/U: HCPCS | Performed by: INTERNAL MEDICINE

## 2019-03-07 PROCEDURE — G8427 DOCREV CUR MEDS BY ELIG CLIN: HCPCS | Performed by: INTERNAL MEDICINE

## 2019-03-07 RX ORDER — ATORVASTATIN CALCIUM 20 MG/1
20 TABLET, FILM COATED ORAL DAILY
Qty: 30 TABLET | Refills: 6 | Status: SHIPPED | OUTPATIENT
Start: 2019-03-07 | End: 2019-06-06 | Stop reason: SDUPTHER

## 2019-03-07 RX ORDER — NIFEDIPINE 60 MG/1
60 TABLET, EXTENDED RELEASE ORAL DAILY
Qty: 30 TABLET | Refills: 5 | Status: SHIPPED | OUTPATIENT
Start: 2019-03-07 | End: 2019-06-06 | Stop reason: SDUPTHER

## 2019-03-07 RX ORDER — FUROSEMIDE 40 MG/1
40 TABLET ORAL 2 TIMES DAILY
Qty: 60 TABLET | Refills: 5 | Status: SHIPPED | OUTPATIENT
Start: 2019-03-07 | End: 2019-06-06 | Stop reason: SDUPTHER

## 2019-03-07 RX ORDER — CHLORTHALIDONE 50 MG/1
50 TABLET ORAL DAILY
Qty: 30 TABLET | Refills: 5 | Status: SHIPPED | OUTPATIENT
Start: 2019-03-07 | End: 2019-06-06 | Stop reason: SDUPTHER

## 2019-03-07 RX ORDER — CARVEDILOL 25 MG/1
25 TABLET ORAL 2 TIMES DAILY WITH MEALS
Qty: 60 TABLET | Refills: 5 | Status: SHIPPED | OUTPATIENT
Start: 2019-03-07 | End: 2019-06-06 | Stop reason: SDUPTHER

## 2019-03-07 RX ORDER — ASPIRIN 81 MG/1
81 TABLET ORAL DAILY
Qty: 90 TABLET | Refills: 5 | Status: SHIPPED | OUTPATIENT
Start: 2019-03-07 | End: 2019-06-06 | Stop reason: SDUPTHER

## 2019-03-07 RX ORDER — FUROSEMIDE 40 MG/1
40 TABLET ORAL 2 TIMES DAILY
COMMUNITY
End: 2019-03-07 | Stop reason: SDUPTHER

## 2019-03-07 RX ORDER — LOSARTAN POTASSIUM 100 MG/1
100 TABLET ORAL DAILY
Qty: 30 TABLET | Refills: 5 | Status: SHIPPED | OUTPATIENT
Start: 2019-03-07 | End: 2019-06-06 | Stop reason: SDUPTHER

## 2019-03-07 ASSESSMENT — ENCOUNTER SYMPTOMS
BLOOD IN STOOL: 0
DIARRHEA: 0
CHEST TIGHTNESS: 0
VOMITING: 0
WHEEZING: 0
NAUSEA: 0
BLURRED VISION: 0
CONSTIPATION: 0

## 2019-05-13 RX ORDER — PEN NEEDLE, DIABETIC 31 GX5/16"
NEEDLE, DISPOSABLE MISCELLANEOUS
Qty: 100 EACH | Refills: 10 | Status: SHIPPED | OUTPATIENT
Start: 2019-05-13 | End: 2020-05-21

## 2019-06-06 ENCOUNTER — OFFICE VISIT (OUTPATIENT)
Dept: PRIMARY CARE CLINIC | Age: 54
End: 2019-06-06
Payer: MEDICAID

## 2019-06-06 VITALS
HEIGHT: 69 IN | SYSTOLIC BLOOD PRESSURE: 120 MMHG | DIASTOLIC BLOOD PRESSURE: 68 MMHG | WEIGHT: 274 LBS | HEART RATE: 86 BPM | BODY MASS INDEX: 40.58 KG/M2

## 2019-06-06 DIAGNOSIS — N28.9 RENAL INSUFFICIENCY: ICD-10-CM

## 2019-06-06 DIAGNOSIS — E11.65 UNCONTROLLED TYPE 2 DIABETES MELLITUS WITH HYPERGLYCEMIA, WITH LONG-TERM CURRENT USE OF INSULIN (HCC): ICD-10-CM

## 2019-06-06 DIAGNOSIS — I10 ESSENTIAL HYPERTENSION: ICD-10-CM

## 2019-06-06 DIAGNOSIS — Z79.4 UNCONTROLLED TYPE 2 DIABETES MELLITUS WITH HYPERGLYCEMIA, WITH LONG-TERM CURRENT USE OF INSULIN (HCC): ICD-10-CM

## 2019-06-06 DIAGNOSIS — E78.49 OTHER HYPERLIPIDEMIA: ICD-10-CM

## 2019-06-06 LAB — HBA1C MFR BLD: 9.9 %

## 2019-06-06 PROCEDURE — 83036 HEMOGLOBIN GLYCOSYLATED A1C: CPT | Performed by: INTERNAL MEDICINE

## 2019-06-06 PROCEDURE — G8417 CALC BMI ABV UP PARAM F/U: HCPCS | Performed by: INTERNAL MEDICINE

## 2019-06-06 PROCEDURE — G8427 DOCREV CUR MEDS BY ELIG CLIN: HCPCS | Performed by: INTERNAL MEDICINE

## 2019-06-06 PROCEDURE — 1036F TOBACCO NON-USER: CPT | Performed by: INTERNAL MEDICINE

## 2019-06-06 PROCEDURE — 3017F COLORECTAL CA SCREEN DOC REV: CPT | Performed by: INTERNAL MEDICINE

## 2019-06-06 PROCEDURE — 3046F HEMOGLOBIN A1C LEVEL >9.0%: CPT | Performed by: INTERNAL MEDICINE

## 2019-06-06 PROCEDURE — 2022F DILAT RTA XM EVC RTNOPTHY: CPT | Performed by: INTERNAL MEDICINE

## 2019-06-06 PROCEDURE — 99214 OFFICE O/P EST MOD 30 MIN: CPT | Performed by: INTERNAL MEDICINE

## 2019-06-06 RX ORDER — FUROSEMIDE 40 MG/1
40 TABLET ORAL 2 TIMES DAILY
Qty: 60 TABLET | Refills: 5 | Status: SHIPPED | OUTPATIENT
Start: 2019-06-06 | End: 2020-03-16

## 2019-06-06 RX ORDER — CHLORTHALIDONE 50 MG/1
50 TABLET ORAL DAILY
Qty: 30 TABLET | Refills: 5 | Status: SHIPPED | OUTPATIENT
Start: 2019-06-06 | End: 2019-12-11 | Stop reason: SDUPTHER

## 2019-06-06 RX ORDER — LOSARTAN POTASSIUM 100 MG/1
100 TABLET ORAL DAILY
Qty: 30 TABLET | Refills: 5 | Status: SHIPPED | OUTPATIENT
Start: 2019-06-06

## 2019-06-06 RX ORDER — NIFEDIPINE 60 MG/1
60 TABLET, EXTENDED RELEASE ORAL DAILY
Qty: 30 TABLET | Refills: 5 | Status: SHIPPED | OUTPATIENT
Start: 2019-06-06 | End: 2019-11-30 | Stop reason: SDUPTHER

## 2019-06-06 RX ORDER — ASPIRIN 81 MG/1
81 TABLET ORAL DAILY
Qty: 90 TABLET | Refills: 5 | Status: SHIPPED | OUTPATIENT
Start: 2019-06-06

## 2019-06-06 RX ORDER — ATORVASTATIN CALCIUM 20 MG/1
20 TABLET, FILM COATED ORAL DAILY
Qty: 90 TABLET | Refills: 6 | Status: SHIPPED | OUTPATIENT
Start: 2019-06-06

## 2019-06-06 RX ORDER — CARVEDILOL 25 MG/1
25 TABLET ORAL 2 TIMES DAILY WITH MEALS
Qty: 60 TABLET | Refills: 5 | Status: SHIPPED | OUTPATIENT
Start: 2019-06-06 | End: 2020-04-13

## 2019-06-06 ASSESSMENT — ENCOUNTER SYMPTOMS
BLOOD IN STOOL: 0
VOMITING: 0
CHEST TIGHTNESS: 0
BLURRED VISION: 0
COUGH: 0
WHEEZING: 0
DIARRHEA: 0
NAUSEA: 0
SHORTNESS OF BREATH: 0
CONSTIPATION: 0

## 2019-06-06 ASSESSMENT — PATIENT HEALTH QUESTIONNAIRE - PHQ9
1. LITTLE INTEREST OR PLEASURE IN DOING THINGS: 0
SUM OF ALL RESPONSES TO PHQ QUESTIONS 1-9: 0
SUM OF ALL RESPONSES TO PHQ9 QUESTIONS 1 & 2: 0
SUM OF ALL RESPONSES TO PHQ QUESTIONS 1-9: 0
2. FEELING DOWN, DEPRESSED OR HOPELESS: 0

## 2019-06-06 NOTE — PROGRESS NOTES
Subjective:      Patient ID: Silvio Carlson is a 47 y.o. male. 6/6/19 Patient presents with:  Diabetes: does not like taking shots ! 3 Month Follow-Up            Last seen  3/7/19 Patient presents with:  3 Month Follow-Up  Diabetes . Toujeo not covered . Has to switch to Jodene Costain . Trying to take Insulin reg     HTN   Taking meds reg . Has seen Nephrology too       last seen  12/19/18 Patient presents with:  Diabetes  Hypertension            3/23/16  Established New Doctor:         Diabetes   He presents for his follow-up diabetic visit. He has type 2 diabetes mellitus. Disease course: Does not check sugars regularly. Pertinent negatives for hypoglycemia include no headaches or tremors. Pertinent negatives for diabetes include no blurred vision, no chest pain and no weakness. There are no hypoglycemic complications. Risk factors for coronary artery disease include diabetes mellitus, dyslipidemia, family history, male sex and obesity. Current diabetic treatment includes insulin injections. He is compliant with treatment some of the time. He rarely participates in exercise. An ACE inhibitor/angiotensin II receptor blocker is being taken. He does not see a podiatrist.Eye exam is current. Hypertension   This is a chronic problem. The problem is controlled. Pertinent negatives include no anxiety, blurred vision, chest pain, headaches or shortness of breath. Risk factors for coronary artery disease include obesity, diabetes mellitus, dyslipidemia and sedentary lifestyle. Review of Systems   Constitutional: Negative for chills and fever. Flu vac 9/18  Tdap 5/17    Pneumovac 5/17     Shingrex 5/18     HENT:        Dental exam >>   Eyes: Positive for visual disturbance. Negative for blurred vision. Eye exam  5/19    S/P Laser treatment for retinopathy   Respiratory: Negative for cough, chest tightness, shortness of breath and wheezing.          Stopped Smoking  Marijuana !! ; reg beer drinker!!! Cardiovascular: Negative. Negative for chest pain. HTN >  No known CAD     Father  of CAD in his 45s and a brother too      Gastrointestinal: Negative for blood in stool, constipation, diarrhea, nausea and vomiting. No FH of  ca colon     Colonoscopy ,  Dr Edu Cristina . Good for 10 yrs    Endocrine:        Diabetes 2002   Genitourinary: Negative for dysuria. No FH of ca prostate     One daughter   Skin: Negative for rash. Allergic/Immunologic: Negative for environmental allergies and food allergies. Neurological: Negative for tremors, weakness and headaches. Psychiatric/Behavioral: Negative for behavioral problems and sleep disturbance. Objective:   Physical Exam   Constitutional: He is oriented to person, place, and time. No distress. Eyes: Conjunctivae are normal.   Neck: Neck supple. Cardiovascular: Normal rate, regular rhythm and normal heart sounds. Pulmonary/Chest: Breath sounds normal.   Abdominal: He exhibits distension. There is no tenderness. Musculoskeletal: Normal range of motion. Foot exam           Neurological: He is alert and oriented to person, place, and time. He has normal strength. Skin: Skin is warm. Psychiatric: He has a normal mood and affect. His behavior is normal.   Vitals reviewed. Assessment:      Andi Solano was seen today for diabetes and 3 month follow-up. Diagnoses and all orders for this visit:    Uncontrolled type 2 diabetes mellitus with hyperglycemia, with long-term current use of insulin ([facility])  A1C still up ! Compliance vs insulin resistance ?  -     POCT glycosylated hemoglobin (Hb A1C)  -     insulin glargine (BASAGLAR KWIKPEN) 100 UNIT/ML injection pen; Inject 160 Units into the skin daily  -     losartan (COZAAR) 100 MG tablet; Take 1 tablet by mouth daily  -     atorvastatin (LIPITOR) 20 MG tablet; Take 1 tablet by mouth daily  -     aspirin (ASPIR-LOW) 81 MG EC tablet;  Take 1 tablet by mouth daily  -     [facility]efer[facility] Valentin MD, Endocrinology, [facility]  -     Lipid Panel; Future  -     Comprehensive Metabolic Panel; Future    Essential hypertension  Controlled   -     losartan (COZAAR) 100 MG tablet; Take 1 tablet by mouth daily  -     NIFEdipine (PROCARDIA XL) 60 MG extended release tablet; Take 1 tablet by mouth daily  -     chlorthalidone (HYGROTEN) 50 MG tablet; Take 1 tablet by mouth daily  -     carvedilol (COREG) 25 MG tablet; Take 1 tablet by mouth 2 times daily (with meals)  -     furosemide (LASIX) 40 MG tablet; Take 1 tablet by mouth 2 times daily  -     Lipid Panel; Future    Renal insufficiency  Has not seen Nephrology yet     Other hyperlipidemia  -     atorvastatin (LIPITOR) 20 MG tablet; Take 1 tablet by mouth daily             Retinopathy . S/P   laser treatment . F/U q 3 mths     Alcohol problem drinking . Plan:      Self Management Goals    Know which medication is for what condition:   Know side effects of medications, and discuss with doctor   Discuss side effects and instructions on new medications. Barriers to medication compliance addressed. All patient questions answered. Pt voiced understanding. Know correct dose/frequency of medications  Take medications at the same time each day  Stay current on medication refills  If taking OTC's check with MD/pharmacy first about interactions  Monitor sugars and record time/meal  A1C goal 7.0 or less  LDL goal 547 or less  Systolic BP < or equal to 045  Diastolic BP < or equal to 85    Advised  Flu and Pneumonia Vax  Set targets for weight loss 4 lbs per month  Exercise 3-5 times per week  Keep check of weight  Weighting machine    On a scale of 1 to 5 how confident are you in these goals?   3/5    Education materials given   Diabetic Education Referral   [phone]

## 2019-06-12 ENCOUNTER — TELEPHONE (OUTPATIENT)
Dept: PAIN MANAGEMENT | Age: 54
End: 2019-06-12

## 2019-06-12 NOTE — TELEPHONE ENCOUNTER
Submitted PA for [employer] 100UNIT/ML pen-injectors, Key: [id number] - PA Case ID: [id number]  Via CM STATUS: PENDING

## 2019-06-13 NOTE — TELEPHONE ENCOUNTER
Xavier Lemus 100UNIT/ML pen-injectors do NOT require prior authorization. Letter attached. Please advise patient. Thank you.

## 2019-07-20 DIAGNOSIS — Z79.4 UNCONTROLLED TYPE 2 DIABETES MELLITUS WITH HYPERGLYCEMIA, WITH LONG-TERM CURRENT USE OF INSULIN (HCC): ICD-10-CM

## 2019-07-20 DIAGNOSIS — I10 ESSENTIAL HYPERTENSION: ICD-10-CM

## 2019-07-20 DIAGNOSIS — E11.65 UNCONTROLLED TYPE 2 DIABETES MELLITUS WITH HYPERGLYCEMIA, WITH LONG-TERM CURRENT USE OF INSULIN (HCC): ICD-10-CM

## 2019-07-23 RX ORDER — LOSARTAN POTASSIUM 100 MG/1
TABLET ORAL
Qty: 30 TABLET | Refills: 4 | Status: SHIPPED | OUTPATIENT
Start: 2019-07-23

## 2019-11-30 DIAGNOSIS — I10 ESSENTIAL HYPERTENSION: ICD-10-CM

## 2019-12-02 RX ORDER — NIFEDIPINE 60 MG/1
TABLET, FILM COATED, EXTENDED RELEASE ORAL
Qty: 90 TABLET | Refills: 0 | Status: SHIPPED | OUTPATIENT
Start: 2019-12-02 | End: 2020-03-05

## 2019-12-11 DIAGNOSIS — I10 ESSENTIAL HYPERTENSION: ICD-10-CM

## 2019-12-12 RX ORDER — CHLORTHALIDONE 50 MG/1
TABLET ORAL
Qty: 90 TABLET | Refills: 4 | Status: SHIPPED | OUTPATIENT
Start: 2019-12-12

## 2020-03-05 RX ORDER — NIFEDIPINE 60 MG/1
TABLET, FILM COATED, EXTENDED RELEASE ORAL
Qty: 90 TABLET | Refills: 0 | Status: SHIPPED | OUTPATIENT
Start: 2020-03-05 | End: 2020-06-05

## 2020-03-16 RX ORDER — FUROSEMIDE 40 MG/1
TABLET ORAL
Qty: 60 TABLET | Refills: 4 | Status: SHIPPED | OUTPATIENT
Start: 2020-03-16

## 2020-04-13 RX ORDER — CARVEDILOL 25 MG/1
TABLET ORAL
Qty: 60 TABLET | Refills: 4 | Status: SHIPPED | OUTPATIENT
Start: 2020-04-13

## 2020-05-14 ENCOUNTER — TELEPHONE (OUTPATIENT)
Dept: PRIMARY CARE CLINIC | Age: 55
End: 2020-05-14

## 2020-05-14 NOTE — TELEPHONE ENCOUNTER
I've spoken to the pt and advised him that our office is closed, he states that he was told by Dr. Martin Huerta that he could come here to have blood work. I advised him that the office and lab is closed and that he could go to the [facility] to the outpatient lab to have them done.  Pt verbalized understanding

## 2020-05-21 RX ORDER — PEN NEEDLE, DIABETIC 31 GX5/16"
NEEDLE, DISPOSABLE MISCELLANEOUS
Qty: 100 EACH | Refills: 9 | Status: SHIPPED | OUTPATIENT
Start: 2020-05-21

## 2020-06-05 RX ORDER — NIFEDIPINE 60 MG/1
TABLET, FILM COATED, EXTENDED RELEASE ORAL
Qty: 90 TABLET | Refills: 0 | Status: SHIPPED | OUTPATIENT
Start: 2020-06-05

## 2020-06-15 RX ORDER — INSULIN GLARGINE 100 [IU]/ML
INJECTION, SOLUTION SUBCUTANEOUS
Qty: 5 PEN | Refills: 4 | Status: SHIPPED | OUTPATIENT
Start: 2020-06-15

## 2020-06-16 NOTE — TELEPHONE ENCOUNTER
Pharmacy / Patient is calling with questions regarding medication     Medication:  LANTUS SOLOSTAR 100 UNIT/ML injection pen       Question or Error: Patient said he should have had basaglar. Is this correct?   Date of last visit : 6/6/2019    Pharmacy confirmed/updated in [facility]

## 2020-06-26 ENCOUNTER — OFFICE VISIT (OUTPATIENT)
Dept: PRIMARY CARE CLINIC | Age: 55
End: 2020-06-26
Payer: MEDICAID

## 2020-06-26 PROCEDURE — G8421 BMI NOT CALCULATED: HCPCS | Performed by: NURSE PRACTITIONER

## 2020-06-26 PROCEDURE — 1036F TOBACCO NON-USER: CPT | Performed by: NURSE PRACTITIONER

## 2020-06-26 PROCEDURE — 99213 OFFICE O/P EST LOW 20 MIN: CPT | Performed by: NURSE PRACTITIONER

## 2020-06-26 PROCEDURE — G8428 CUR MEDS NOT DOCUMENT: HCPCS | Performed by: NURSE PRACTITIONER

## 2020-06-26 PROCEDURE — 3017F COLORECTAL CA SCREEN DOC REV: CPT | Performed by: NURSE PRACTITIONER

## 2020-06-29 LAB
SARS-COV-2: NOT DETECTED
SOURCE: NORMAL

## 2020-09-09 RX ORDER — NIFEDIPINE 60 MG/1
TABLET, FILM COATED, EXTENDED RELEASE ORAL
Qty: 90 TABLET | Refills: 0 | OUTPATIENT
Start: 2020-09-09

## 2021-03-19 DIAGNOSIS — I10 ESSENTIAL HYPERTENSION: ICD-10-CM

## 2021-03-22 RX ORDER — CHLORTHALIDONE 50 MG/1
TABLET ORAL
Qty: 90 TABLET | Refills: 3 | OUTPATIENT
Start: 2021-03-22

## 2022-04-27 NOTE — PROGRESS NOTES
How Severe Is Your Cyst?: moderate MD [employer] [place] Board Certified in Sleep Medicine  Certified in [address] [place] [address] Certified in Neurology Sia Solomon [address],  Truman Hernandez [address]  [address] [facility] [address] Sleep   [address] [facility] [address] [address], [address] [place]           [address]  [address] [facility] [address] [phone]    Subjective:     Patient ID: Hernandez Hastings is a 48 y.o. male. Chief Complaint   Patient presents with   Meredith Levin, snoring, gasping for air,        HPI:        Hernandez Hastings is a 48 y.o. male referred by Dr Eliazar Levin for a sleep evaluation. He complains of snoring, snorting, choking, tossing and turning, excessive daytime sleepiness, feels sleepy during the day, take naps during the day but he denies periods of not breathing, knees buckling with laughing, completely or partially paralyzed while falling asleep or waking up, noisy environment, uncomfortable room temperature, uncomfortable bedding. Symptoms began several years ago, gradually worsening since that time. The patient's bed-partner confirmed the snoring but not the stopped breathing at night  SLEEP SCHEDULE: Goes to bed around 1-2 AM in the weekdays and 1-2 AM in the weekends. It usually takes the patient few minutes to fall asleep. The patient gets up 2-3 per night to go to the bathroom. The Patient finally gets up at 7-8 AM during the weekdays and 7-8 AM in the weekends. patient wakes up with dry mouth. The patient has restless sleep with frequent arousals in addition to the Patient has significant daytime sleepiness. The Patient scored Total score: 11 on Kohler Sleepiness Scale ( more than 10 is indicative of daytime sleepiness) and 60 in fatigue scale ( more than 36 is indicative of daytime fatigue). The patient takes 3 naps/week for 60-90 minutes and usually is not refreshing nap.      Previous Is This A New Presentation, Or A Follow-Up?: Cyst evaluation and treatment has included- none. DOT/CDL - N/A  FAA/'s license - N/A      Previous Report(s) Reviewed: historical medical records         Social History     Social History    Marital status: Single     Spouse name: N/A    Number of children: N/A    Years of education: N/A     Occupational History    Not on file. Social History Main Topics    Smoking status: Former Smoker    Smokeless tobacco: Former User    Alcohol use Yes      Comment: socially on weekends    Drug use: Yes      Comment: socially    Sexual activity: No     Other Topics Concern    Not on file     Social History Narrative    No narrative on file       Prior to Admission medications    Medication Sig Start Date End Date Taking?  Authorizing Provider   insulin glargine (TOUJEO SOLOSTAR) 300 UNIT/ML injection pen Inject 120 Units into the skin every morning 9/19/18  Yes Felipa Vásquez MD   carvedilol (COREG) 25 MG tablet Take 1 tablet by mouth 2 times daily (with meals) 9/19/18  Yes Felipa Vásquez MD   chlorthalidone (HYGROTEN) 50 MG tablet Take 1 tablet by mouth daily 9/19/18  Yes Felipa Vásquez MD   NIFEdipine (PROCARDIA XL) 60 MG extended release tablet Take 1 tablet by mouth daily 9/19/18  Yes Felipa Vásquez MD   ergocalciferol (ERGOCALCIFEROL) 66311 units capsule Take 50,000 Units by mouth once a week On Mondays   Yes Historical Provider, MD   furosemide (LASIX) 40 MG tablet Take 40 mg by mouth 2 times daily   Yes Historical Provider, MD   losartan (COZAAR) 100 MG tablet Take 100 mg by mouth daily   Yes Historical Provider, MD   Exenatide 2 MG PEN Inject 1 pen into the skin once a week 8/17/18  Yes Felipa Vásquez MD   atorvastatin (LIPITOR) 20 MG tablet Take 1 tablet by mouth daily 8/15/18  Yes Felipa Vásquez MD   aspirin (ASPIR-LOW) 81 MG EC tablet Take 1 tablet by mouth daily 8/15/18  Yes Felipa Vásquez MD   ONE TOUCH ULTRA TEST strip USE TO TEST BLOOD SUGAR THREE TIMES A DAY 5/7/18  Yes Lux Fenton Rian Raya MD   B-D ULTRAFINE III SHORT PEN 31G X 8 MM MISC USE WITH INSULIN PEN 3/21/18  Yes Praveen Shabazz MD   acetaminophen (APAP EXTRA STRENGTH) 500 MG tablet Take 1 tablet by mouth every 6 hours as needed for Pain 8/24/17  Yes Praveen Shabazz MD   glucose blood VI test strips (ONE TOUCH TEST STRIPS) strip 1 each by In Vitro route 3 times daily One touch Ultra  DX: E11.65 and E11.9 1/5/17  Yes Praveen Shabazz MD   glucose blood VI test strips (ACCU-CHEK NICKY PLUS) strip TEST 4 TO 6 TIMES DAILY 8/2/16  Yes Praveen Shabazz MD   Blood Glucose Monitoring Suppl JOSE RAMON 1 Device by Does not apply route 3 times daily as needed One touch Ultra  DX: E11.65 and E11.9 3/31/16  Yes Praveen Shabazz MD   Accu-Chek Multiclix Lancets MISC TEST 4 TIMES A DAY 3/24/16  Yes Praveen Shabazz MD       Allergies as of 09/24/2018    (No Known Allergies)       Patient Active Problem List   Diagnosis    Diabetes mellitus ([address]    Hyperlipidemia    Hypogonadism male    Ataxia    Lightheadedness    Alcohol problem drinking    Neuropathy    HTN (hypertension)    Diabetes mellitus type II, uncontrolled ([address]       Past Medical History:   Diagnosis Date    HLD (hyperlipidemia)     Hypertension     Type II or unspecified type diabetes mellitus without mention of complication, not stated as uncontrolled        Past Surgical History:   Procedure Laterality Date    COLONOSCOPY  4-12-16    Dr Leonardo Yung      abdominal       Family History   Problem Relation Age of Onset    High Blood Pressure Mother     Diabetes Father        Review of Systems   Constitutional: Positive for fatigue and unexpected weight change. HENT: Positive for congestion. Eyes: Negative. Respiratory: Positive for choking and shortness of breath. Cardiovascular: Positive for leg swelling. Gastrointestinal: Negative. Endocrine: Negative. Genitourinary: Positive for frequency (nighttime). Musculoskeletal: Negative.

## 2023-10-06 PROBLEM — N18.32 STAGE 3B CHRONIC KIDNEY DISEASE (HCC): Status: ACTIVE | Noted: 2023-10-06

## 2023-10-06 PROBLEM — L97.525 DIABETIC ULCER OF TOE OF LEFT FOOT ASSOCIATED WITH TYPE 2 DIABETES MELLITUS, WITH MUSCLE INVOLVEMENT WITHOUT EVIDENCE OF NECROSIS (HCC): Status: ACTIVE | Noted: 2023-03-06

## 2023-10-06 PROBLEM — E11.3559 STABLE PROLIFERATIVE DIABETIC RETINOPATHY ASSOCIATED WITH TYPE 2 DIABETES MELLITUS (HCC): Status: ACTIVE | Noted: 2021-10-12

## 2023-10-06 PROBLEM — D47.2 MGUS (MONOCLONAL GAMMOPATHY OF UNKNOWN SIGNIFICANCE): Status: ACTIVE | Noted: 2023-03-17

## 2023-10-06 PROBLEM — N17.9 ACUTE RENAL FAILURE SUPERIMPOSED ON STAGE 3B CHRONIC KIDNEY DISEASE (HCC): Status: ACTIVE | Noted: 2023-10-06

## 2023-10-06 PROBLEM — I11.0 HYPERTENSIVE HEART DISEASE WITH CHRONIC DIASTOLIC CONGESTIVE HEART FAILURE (HCC): Status: ACTIVE | Noted: 2023-03-06

## 2023-10-06 PROBLEM — N25.0 RENAL OSTEODYSTROPHY: Status: ACTIVE | Noted: 2020-02-19

## 2023-10-06 PROBLEM — A04.72 C. DIFFICILE DIARRHEA: Status: ACTIVE | Noted: 2023-10-06

## 2023-10-06 PROBLEM — E11.621 DIABETIC ULCER OF TOE OF LEFT FOOT ASSOCIATED WITH TYPE 2 DIABETES MELLITUS, WITH MUSCLE INVOLVEMENT WITHOUT EVIDENCE OF NECROSIS (HCC): Status: ACTIVE | Noted: 2023-03-06

## 2023-10-06 PROBLEM — I50.32 HYPERTENSIVE HEART DISEASE WITH CHRONIC DIASTOLIC CONGESTIVE HEART FAILURE (HCC): Status: ACTIVE | Noted: 2023-03-06

## 2023-10-06 PROBLEM — N18.31 STAGE 3A CHRONIC KIDNEY DISEASE (HCC): Status: ACTIVE | Noted: 2021-02-26

## 2023-10-06 PROBLEM — N18.32 ACUTE RENAL FAILURE SUPERIMPOSED ON STAGE 3B CHRONIC KIDNEY DISEASE (HCC): Status: ACTIVE | Noted: 2023-10-06

## 2023-10-06 PROBLEM — D63.1 ANEMIA IN CHRONIC RENAL DISEASE: Status: ACTIVE | Noted: 2020-02-19

## 2023-10-06 PROBLEM — R78.81 GRAM-POSITIVE BACTEREMIA: Status: ACTIVE | Noted: 2023-03-11

## 2023-10-06 PROBLEM — M86.172 ACUTE OSTEOMYELITIS OF TOE, LEFT (HCC): Status: ACTIVE | Noted: 2023-03-11

## 2023-10-06 PROBLEM — I70.25 ATHEROSCLEROSIS OF NATIVE ARTERY OF EXTREMITY WITH ULCERATION (HCC): Status: ACTIVE | Noted: 2023-03-11

## 2023-10-06 PROBLEM — N18.9 ANEMIA IN CHRONIC RENAL DISEASE: Status: ACTIVE | Noted: 2020-02-19

## 2023-10-06 PROBLEM — I50.31 ACUTE DIASTOLIC HEART FAILURE (HCC): Status: ACTIVE | Noted: 2023-10-06

## 2023-10-06 PROBLEM — I50.20 HFREF (HEART FAILURE WITH REDUCED EJECTION FRACTION) (HCC): Status: ACTIVE | Noted: 2023-03-06

## 2023-10-06 RX ORDER — GLUCAGON 1 MG
KIT INJECTION
COMMUNITY
Start: 2022-08-06

## 2023-10-06 RX ORDER — EZETIMIBE 10 MG/1
1 TABLET ORAL DAILY
COMMUNITY
Start: 2022-10-06

## 2023-10-06 RX ORDER — VALSARTAN 320 MG/1
320 TABLET ORAL DAILY
COMMUNITY
Start: 2023-03-24